# Patient Record
Sex: FEMALE | Race: WHITE | NOT HISPANIC OR LATINO | Employment: OTHER | URBAN - METROPOLITAN AREA
[De-identification: names, ages, dates, MRNs, and addresses within clinical notes are randomized per-mention and may not be internally consistent; named-entity substitution may affect disease eponyms.]

---

## 2023-11-07 PROBLEM — J44.9 COPD (CHRONIC OBSTRUCTIVE PULMONARY DISEASE) (MULTI): Status: ACTIVE | Noted: 2023-11-07

## 2023-11-07 PROBLEM — R14.0 ABDOMINAL BLOATING: Status: ACTIVE | Noted: 2023-11-07

## 2023-11-07 PROBLEM — E07.9 THYROID DISEASE: Status: ACTIVE | Noted: 2023-11-07

## 2023-11-07 PROBLEM — E66.9 OBESITY: Status: ACTIVE | Noted: 2023-11-07

## 2023-11-07 PROBLEM — I25.10 ARTERIOSCLEROTIC CARDIOVASCULAR DISEASE: Status: ACTIVE | Noted: 2023-11-07

## 2023-11-07 PROBLEM — E89.0 HYPOTHYROIDISM, POSTABLATIVE: Status: ACTIVE | Noted: 2023-11-07

## 2023-11-07 PROBLEM — I10 HYPERTENSION: Status: ACTIVE | Noted: 2023-11-07

## 2023-11-07 PROBLEM — E78.5 HYPERLIPIDEMIA: Status: ACTIVE | Noted: 2023-11-07

## 2023-11-07 PROBLEM — E11.9 DIABETES MELLITUS (MULTI): Status: ACTIVE | Noted: 2023-11-07

## 2023-11-07 PROBLEM — R73.09 ABNORMAL GLUCOSE: Status: ACTIVE | Noted: 2023-11-07

## 2023-11-07 PROBLEM — H52.4 HYPEROPIA WITH PRESBYOPIA: Status: ACTIVE | Noted: 2023-11-07

## 2023-11-07 PROBLEM — G47.33 OBSTRUCTIVE SLEEP APNEA: Status: ACTIVE | Noted: 2023-11-07

## 2023-11-07 PROBLEM — H52.00 HYPEROPIA WITH PRESBYOPIA: Status: ACTIVE | Noted: 2023-11-07

## 2023-11-07 RX ORDER — AMLODIPINE BESYLATE 10 MG/1
0.5 TABLET ORAL DAILY
COMMUNITY
Start: 2021-01-27

## 2023-11-07 RX ORDER — ATORVASTATIN CALCIUM 80 MG/1
1 TABLET, FILM COATED ORAL NIGHTLY
COMMUNITY
Start: 2021-01-27

## 2023-11-07 RX ORDER — DOXYCYCLINE HYCLATE 100 MG
TABLET ORAL
COMMUNITY
Start: 2022-02-08 | End: 2024-02-13 | Stop reason: WASHOUT

## 2023-11-07 RX ORDER — ALENDRONATE SODIUM 70 MG/1
TABLET ORAL
COMMUNITY
Start: 2021-11-04 | End: 2024-05-02 | Stop reason: ALTCHOICE

## 2023-11-07 RX ORDER — ASPIRIN 81 MG/1
1 TABLET ORAL DAILY
COMMUNITY
Start: 2021-01-27

## 2023-11-07 RX ORDER — ALBUTEROL SULFATE 90 UG/1
AEROSOL, METERED RESPIRATORY (INHALATION)
COMMUNITY

## 2023-11-07 RX ORDER — ROPINIROLE 0.5 MG/1
1 TABLET, FILM COATED ORAL DAILY
COMMUNITY

## 2023-11-07 RX ORDER — LEVOTHYROXINE SODIUM 50 UG/1
1 TABLET ORAL DAILY
COMMUNITY
Start: 2022-02-08 | End: 2024-04-23

## 2023-11-07 RX ORDER — GLIPIZIDE 10 MG/1
1 TABLET ORAL DAILY
COMMUNITY
Start: 2021-04-29 | End: 2023-11-08 | Stop reason: ALTCHOICE

## 2023-11-07 RX ORDER — METOPROLOL SUCCINATE 25 MG/1
1 TABLET, EXTENDED RELEASE ORAL DAILY
COMMUNITY
Start: 2021-01-27

## 2023-11-07 RX ORDER — LISINOPRIL 10 MG/1
1 TABLET ORAL DAILY
COMMUNITY
Start: 2021-01-27

## 2023-11-07 RX ORDER — BENZONATATE 100 MG/1
CAPSULE ORAL
COMMUNITY
Start: 2022-02-08 | End: 2024-02-13 | Stop reason: WASHOUT

## 2023-11-08 ENCOUNTER — OFFICE VISIT (OUTPATIENT)
Dept: ENDOCRINOLOGY | Facility: CLINIC | Age: 63
End: 2023-11-08
Payer: MEDICAID

## 2023-11-08 VITALS
SYSTOLIC BLOOD PRESSURE: 145 MMHG | DIASTOLIC BLOOD PRESSURE: 88 MMHG | BODY MASS INDEX: 27.72 KG/M2 | WEIGHT: 177 LBS | HEART RATE: 83 BPM

## 2023-11-08 DIAGNOSIS — E11.9 TYPE 2 DIABETES MELLITUS WITHOUT COMPLICATION, WITHOUT LONG-TERM CURRENT USE OF INSULIN (MULTI): Primary | ICD-10-CM

## 2023-11-08 PROCEDURE — 3077F SYST BP >= 140 MM HG: CPT | Performed by: INTERNAL MEDICINE

## 2023-11-08 PROCEDURE — 3079F DIAST BP 80-89 MM HG: CPT | Performed by: INTERNAL MEDICINE

## 2023-11-08 PROCEDURE — 4010F ACE/ARB THERAPY RXD/TAKEN: CPT | Performed by: INTERNAL MEDICINE

## 2023-11-08 PROCEDURE — 1036F TOBACCO NON-USER: CPT | Performed by: INTERNAL MEDICINE

## 2023-11-08 PROCEDURE — 99214 OFFICE O/P EST MOD 30 MIN: CPT | Performed by: INTERNAL MEDICINE

## 2023-11-08 ASSESSMENT — ENCOUNTER SYMPTOMS
DIARRHEA: 0
ENDOCRINE COMMENTS: AS ABOVE
COUGH: 0
NAUSEA: 0
UNEXPECTED WEIGHT CHANGE: 0
VOMITING: 0

## 2023-11-08 NOTE — LETTER
November 8, 2023     Vianey Summers PA-C  [address] - [facility] [address]    Patient: Marina Collazo   YOB: 1960   Date of Visit: 11/8/2023       Dear Dr. Vianey Summers PA-C:    Thank you for referring Marina Collazo to me for evaluation. Below are my notes for this consultation.  If you have questions, please do not hesitate to call me. I look forward to following your patient along with you.       Sincerely,     Pollo Graves MD      CC: No Recipients  ______________________________________________________________________________________    History Of Present Illness  Marina Collazo is a 63 y.o. female     Duration of type 2 diabetes mellitus:  3 years  Complications:  Coronary artery disease    A1c 6.7% per patient    Glipizide 2.5 mg BID, breaking 10 mg tablet  Jardiance 10 mg/day    DexCom G7  Patient is testing glucose 1440 times daily  Records reviewed, on file    Graves' orbitopathy  Last eye exam:  Dr Campbell, July 2023  CT orbits done    Past Medical History  She has a past medical history of Personal history of other diseases of the respiratory system (05/15/2014), Personal history of other endocrine, nutritional and metabolic disease (05/15/2014), and Type 2 diabetes mellitus without complications (CMS/[facility]) (07/01/2015).    Surgical History  She has a past surgical history that includes Appendectomy (05/15/2014) and Other surgical history (05/15/2020).     Social History  She reports that she has never smoked. She has never used smokeless tobacco. No history on file for alcohol use and drug use.    Family History  Family History   Problem Relation Name Age of Onset   • Stroke Mother         Allergies  Codeine, Doxylamine-dm-acetaminophen, Lorazepam, Other, Penicillins, and Propoxyphene    Review of Systems   Constitutional:  Negative for unexpected weight change.   Eyes:  Negative for visual disturbance.  "  Respiratory:  Negative for cough.    Gastrointestinal:  Negative for diarrhea, nausea and vomiting.   Endocrine:        As above         Last Recorded Vitals  Blood pressure 145/88, pulse 83, weight 80.3 kg (177 lb).    Physical Exam  HENT:      Head: Normocephalic.   Eyes:      Extraocular Movements: Extraocular movements intact.      Comments: Thyroid stare   Neck:      Thyroid: No thyromegaly.   Musculoskeletal:      Right foot: Deformity (2nd hammertoe) present.      Left foot: Deformity (surgical reconstruction, scars) present.   Lymphadenopathy:      Cervical: No cervical adenopathy.   Neurological:      Mental Status: She is alert.      Motor: No tremor.   Psychiatric:         Mood and Affect: Affect normal.          Relevant Results  Glucose (mg/dL)   Date Value   12/12/2022 99   01/18/2021 151 (H)   01/17/2021 257 (H)     Hemoglobin A1C (%)   Date Value   10/19/2022 6.7 (H)   06/23/2022 7.0 (H)   03/10/2022 7.5 (H)     Bicarbonate (mmol/L)   Date Value   12/12/2022 25   01/18/2021 26   01/17/2021 23     Urea Nitrogen (mg/dL)   Date Value   12/12/2022 14   01/18/2021 10   01/17/2021 6     Creatinine (mg/dL)   Date Value   12/12/2022 0.80   01/18/2021 0.64   01/17/2021 0.62     No components found for: \"IIKIUZXGQOAEYX5E\"  Lab Results   Component Value Date    CHOL 203 (H) 01/18/2021     Lab Results   Component Value Date    HDL 41.0 01/18/2021     No results found for: \"LDLCALC\"  Lab Results   Component Value Date    TRIG 293 (H) 01/18/2021     No components found for: \"CHOLHDL\"   Lab Results   Component Value Date    TSH 1.06 05/15/2020         IMPRESSION  TYPE 2 DIABETES MELLITUS  Glucose well controlled, overall  Patient cutting back glipizide to avoid hypoglycemia      RECOMMENDATIONS  Increase Jardiance to 25 mg once daily  STOP glipizide    Follow up 3-4 months  Labs as ordered by FABRICIO Summers.     "

## 2023-11-08 NOTE — PATIENT INSTRUCTIONS
RECOMMENDATIONS  Increase Jardiance to 25 mg once daily  STOP glipizide    Follow up 3-4 months  Labs as ordered by FABRICIO Summers.

## 2023-11-08 NOTE — PROGRESS NOTES
History Of Present Illness  Marina Collazo is a 63 y.o. female     Duration of type 2 diabetes mellitus:  3 years  Complications:  Coronary artery disease    A1c 6.7% per patient    Glipizide 2.5 mg BID, breaking 10 mg tablet  Jardiance 10 mg/day    DexCom G7  Patient is testing glucose 1440 times daily  Records reviewed, on file    Graves' orbitopathy  Last eye exam:  Dr Campbell, July 2023  CT orbits done    Past Medical History  She has a past medical history of Personal history of other diseases of the respiratory system (05/15/2014), Personal history of other endocrine, nutritional and metabolic disease (05/15/2014), and Type 2 diabetes mellitus without complications (CMS/[facility]) (07/01/2015).    Surgical History  She has a past surgical history that includes Appendectomy (05/15/2014) and Other surgical history (05/15/2020).     Social History  She reports that she has never smoked. She has never used smokeless tobacco. No history on file for alcohol use and drug use.    Family History  Family History   Problem Relation Name Age of Onset    Stroke Mother         Allergies  Codeine, Doxylamine-dm-acetaminophen, Lorazepam, Other, Penicillins, and Propoxyphene    Review of Systems   Constitutional:  Negative for unexpected weight change.   Eyes:  Negative for visual disturbance.   Respiratory:  Negative for cough.    Gastrointestinal:  Negative for diarrhea, nausea and vomiting.   Endocrine:        As above         Last Recorded Vitals  Blood pressure 145/88, pulse 83, weight 80.3 kg (177 lb).    Physical Exam  HENT:      Head: Normocephalic.   Eyes:      Extraocular Movements: Extraocular movements intact.      Comments: Thyroid stare   Neck:      Thyroid: No thyromegaly.   Musculoskeletal:      Right foot: Deformity (2nd hammertoe) present.      Left foot: Deformity (surgical reconstruction, scars) present.   Lymphadenopathy:      Cervical: No cervical adenopathy.   Neurological:      Mental Status: She is alert.  "     Motor: No tremor.   Psychiatric:         Mood and Affect: Affect normal.          Relevant Results  Glucose (mg/dL)   Date Value   12/12/2022 99   01/18/2021 151 (H)   01/17/2021 257 (H)     Hemoglobin A1C (%)   Date Value   10/19/2022 6.7 (H)   06/23/2022 7.0 (H)   03/10/2022 7.5 (H)     Bicarbonate (mmol/L)   Date Value   12/12/2022 25   01/18/2021 26   01/17/2021 23     Urea Nitrogen (mg/dL)   Date Value   12/12/2022 14   01/18/2021 10   01/17/2021 6     Creatinine (mg/dL)   Date Value   12/12/2022 0.80   01/18/2021 0.64   01/17/2021 0.62     No components found for: \"DRISYCCUDBYHMJ2G\"  Lab Results   Component Value Date    CHOL 203 (H) 01/18/2021     Lab Results   Component Value Date    HDL 41.0 01/18/2021     No results found for: \"LDLCALC\"  Lab Results   Component Value Date    TRIG 293 (H) 01/18/2021     No components found for: \"CHOLHDL\"   Lab Results   Component Value Date    TSH 1.06 05/15/2020         IMPRESSION  TYPE 2 DIABETES MELLITUS  Glucose well controlled, overall  Patient cutting back glipizide to avoid hypoglycemia      RECOMMENDATIONS  Increase Jardiance to 25 mg once daily  STOP glipizide    Follow up 3-4 months  Labs as ordered by FABRICIO Summers.     "

## 2023-12-01 ENCOUNTER — APPOINTMENT (OUTPATIENT)
Dept: OPHTHALMOLOGY | Facility: CLINIC | Age: 63
End: 2023-12-01
Payer: MEDICAID

## 2024-02-09 DIAGNOSIS — Z12.11 SCREEN FOR COLON CANCER: ICD-10-CM

## 2024-02-09 RX ORDER — SODIUM PICOSULFATE, MAGNESIUM OXIDE, AND ANHYDROUS CITRIC ACID 12; 3.5; 1 G/175ML; G/175ML; MG/175ML
2 LIQUID ORAL AS NEEDED
Qty: 2 ML | Refills: 0 | Status: SHIPPED | OUTPATIENT
Start: 2024-02-09 | End: 2024-02-13 | Stop reason: WASHOUT

## 2024-02-13 ENCOUNTER — OFFICE VISIT (OUTPATIENT)
Dept: ENDOCRINOLOGY | Facility: CLINIC | Age: 64
End: 2024-02-13
Payer: MEDICAID

## 2024-02-13 VITALS
SYSTOLIC BLOOD PRESSURE: 143 MMHG | DIASTOLIC BLOOD PRESSURE: 84 MMHG | BODY MASS INDEX: 28.35 KG/M2 | WEIGHT: 181 LBS | HEART RATE: 87 BPM

## 2024-02-13 DIAGNOSIS — E11.9 TYPE 2 DIABETES MELLITUS WITHOUT COMPLICATION, WITHOUT LONG-TERM CURRENT USE OF INSULIN (MULTI): Primary | ICD-10-CM

## 2024-02-13 LAB — POC HEMOGLOBIN A1C: 7.4 % (ref 4.2–6.5)

## 2024-02-13 PROCEDURE — 4010F ACE/ARB THERAPY RXD/TAKEN: CPT | Performed by: INTERNAL MEDICINE

## 2024-02-13 PROCEDURE — 3079F DIAST BP 80-89 MM HG: CPT | Performed by: INTERNAL MEDICINE

## 2024-02-13 PROCEDURE — 99214 OFFICE O/P EST MOD 30 MIN: CPT | Performed by: INTERNAL MEDICINE

## 2024-02-13 PROCEDURE — 3077F SYST BP >= 140 MM HG: CPT | Performed by: INTERNAL MEDICINE

## 2024-02-13 PROCEDURE — 1036F TOBACCO NON-USER: CPT | Performed by: INTERNAL MEDICINE

## 2024-02-13 PROCEDURE — 83036 HEMOGLOBIN GLYCOSYLATED A1C: CPT | Performed by: INTERNAL MEDICINE

## 2024-02-13 ASSESSMENT — ENCOUNTER SYMPTOMS
ENDOCRINE COMMENTS: AS ABOVE
DIARRHEA: 0
NAUSEA: 0
UNEXPECTED WEIGHT CHANGE: 0
SHORTNESS OF BREATH: 0
VOMITING: 0

## 2024-02-13 NOTE — LETTER
February 13, 2024     Vianey Summers PA-C  [address] - [facility] [address]    Patient: Marina Collazo   YOB: 1960   Date of Visit: 2/13/2024       Dear Dr. Vianey Summers PA-C:    Thank you for referring Marina Collazo to me for evaluation. Below are my notes for this consultation.  If you have questions, please do not hesitate to call me. I look forward to following your patient along with you.       Sincerely,     Pollo Gravse MD      CC: No Recipients  ______________________________________________________________________________________    History Of Present Illness  Marina Collazo is a 63 y.o. female     Duration of type 2 diabetes mellitus:  3 years  Complications:  Coronary artery disease     Regurgitated Jardiance 25 mg but tolerates 10 mg     Jardiance 10 mg/day     Glipizide stopped    DexCom G7  Patient is testing glucose 1440 times daily  Records reviewed, on file      panicks when her glucose is at 85 mg/dl on DexCom    Graves' orbitopathy  Last eye exam:  Dr Campbell, July 2023  CT orbits done    Past Medical History  She has a past medical history of Personal history of other diseases of the respiratory system (05/15/2014), Personal history of other endocrine, nutritional and metabolic disease (05/15/2014), and Type 2 diabetes mellitus without complications (CMS/[facility]) (07/01/2015).    Surgical History  She has a past surgical history that includes Appendectomy (05/15/2014) and Other surgical history (05/15/2020).     Social History  She reports that she has never smoked. She has never used smokeless tobacco. No history on file for alcohol use and drug use.    Family History  Family History   Problem Relation Name Age of Onset   • Stroke Mother         Medications  Current Outpatient Medications   Medication Instructions   • albuterol (Ventolin HFA) 90 mcg/actuation inhaler inhalation   • alendronate  (Fosamax) 70 mg tablet oral   • amLODIPine (Norvasc) 10 mg tablet 0.5 tablets, oral, Daily   • aspirin 81 mg EC tablet 1 tablet, oral, Daily   • atorvastatin (Lipitor) 80 mg tablet 1 tablet, oral, Nightly   • cetirizine (ZyrTEC) 10 mg capsule oral   • levothyroxine (Synthroid, Levoxyl) 50 mcg tablet 1 tablet, oral, Daily   • lisinopril 10 mg tablet 1 tablet, oral, Daily   • metoprolol succinate XL (Toprol-XL) 25 mg 24 hr tablet 1 tablet, oral, Daily   • rOPINIRole (Requip) 0.5 mg tablet 1 tablet, oral, Daily       Allergies  Codeine, Doxylamine-dm-acetaminophen, Jardiance [empagliflozin], Lorazepam, Other, Penicillins, and Propoxyphene    Review of Systems   Constitutional:  Negative for unexpected weight change.   Eyes:         Graves' orbitopathy   Respiratory:  Negative for shortness of breath.    Gastrointestinal:  Negative for diarrhea, nausea and vomiting.   Endocrine:        As above         Last Recorded Vitals  Blood pressure 143/84, pulse 87, weight 82.1 kg (181 lb).    Physical Exam  HENT:      Head: Normocephalic.   Eyes:      Comments: Periorbital edema, bilat   Neck:      Thyroid: No thyromegaly.   Cardiovascular:      Pulses:           Radial pulses are 2+ on the right side and 2+ on the left side.        Dorsalis pedis pulses are 2+ on the right side and 2+ on the left side.   Musculoskeletal:      Right lower leg: No edema.      Left lower leg: No edema.   Feet:      Comments: Ingrown right 1st toenail  Lymphadenopathy:      Cervical: No cervical adenopathy.   Skin:     Comments: No foot sores   Neurological:      Mental Status: She is alert.      Motor: No tremor.   Psychiatric:         Mood and Affect: Affect normal.          IMPRESSION  TYPE 2 DIABETES MELLITUS  Rapid A1c 7.4%  Advised she is not actually hypoglycemia, and has a very low risk of hypoglycemia attributable to Jardiance      RECOMMENDATIONS  Call Dr. Marino for evaluation of left 1st ingrown toenail.   Jardiance does not cause  hypoglycemia  Glucose above 70 is normal.     Labs as ordered by FABRICIO Summers    Follow up 6 months

## 2024-02-13 NOTE — PROGRESS NOTES
History Of Present Illness  Marina Collazo is a 63 y.o. female     Duration of type 2 diabetes mellitus:  3 years  Complications:  Coronary artery disease     Regurgitated Jardiance 25 mg but tolerates 10 mg     Jardiance 10 mg/day     Glipizide stopped    DexCom G7  Patient is testing glucose 1440 times daily  Records reviewed, on file      panicks when her glucose is at 85 mg/dl on DexCom    Graves' orbitopathy  Last eye exam:  Dr Campbell, July 2023  CT orbits done    Past Medical History  She has a past medical history of Personal history of other diseases of the respiratory system (05/15/2014), Personal history of other endocrine, nutritional and metabolic disease (05/15/2014), and Type 2 diabetes mellitus without complications (CMS/[facility]) (07/01/2015).    Surgical History  She has a past surgical history that includes Appendectomy (05/15/2014) and Other surgical history (05/15/2020).     Social History  She reports that she has never smoked. She has never used smokeless tobacco. No history on file for alcohol use and drug use.    Family History  Family History   Problem Relation Name Age of Onset    Stroke Mother         Medications  Current Outpatient Medications   Medication Instructions    albuterol (Ventolin HFA) 90 mcg/actuation inhaler inhalation    alendronate (Fosamax) 70 mg tablet oral    amLODIPine (Norvasc) 10 mg tablet 0.5 tablets, oral, Daily    aspirin 81 mg EC tablet 1 tablet, oral, Daily    atorvastatin (Lipitor) 80 mg tablet 1 tablet, oral, Nightly    cetirizine (ZyrTEC) 10 mg capsule oral    levothyroxine (Synthroid, Levoxyl) 50 mcg tablet 1 tablet, oral, Daily    lisinopril 10 mg tablet 1 tablet, oral, Daily    metoprolol succinate XL (Toprol-XL) 25 mg 24 hr tablet 1 tablet, oral, Daily    rOPINIRole (Requip) 0.5 mg tablet 1 tablet, oral, Daily       Allergies  Codeine, Doxylamine-dm-acetaminophen, Jardiance [empagliflozin], Lorazepam, Other, Penicillins, and Propoxyphene    Review of  Systems   Constitutional:  Negative for unexpected weight change.   Eyes:         Graves' orbitopathy   Respiratory:  Negative for shortness of breath.    Gastrointestinal:  Negative for diarrhea, nausea and vomiting.   Endocrine:        As above         Last Recorded Vitals  Blood pressure 143/84, pulse 87, weight 82.1 kg (181 lb).    Physical Exam  HENT:      Head: Normocephalic.   Eyes:      Comments: Periorbital edema, bilat   Neck:      Thyroid: No thyromegaly.   Cardiovascular:      Pulses:           Radial pulses are 2+ on the right side and 2+ on the left side.        Dorsalis pedis pulses are 2+ on the right side and 2+ on the left side.   Musculoskeletal:      Right lower leg: No edema.      Left lower leg: No edema.   Feet:      Comments: Ingrown right 1st toenail  Lymphadenopathy:      Cervical: No cervical adenopathy.   Skin:     Comments: No foot sores   Neurological:      Mental Status: She is alert.      Motor: No tremor.   Psychiatric:         Mood and Affect: Affect normal.          IMPRESSION  TYPE 2 DIABETES MELLITUS  Rapid A1c 7.4%  Advised she is not actually hypoglycemia, and has a very low risk of hypoglycemia attributable to Jardiance      RECOMMENDATIONS  Call Dr. Marino for evaluation of left 1st ingrown toenail.   Jardiance does not cause hypoglycemia  Glucose above 70 is normal.     Labs as ordered by FABRICIO Summers    Follow up 6 months

## 2024-02-13 NOTE — PATIENT INSTRUCTIONS
A1c 7.4%    RECOMMENDATIONS  Call Dr. Marino for evaluation of left 1st ingrown toenail.   Jardiance does not cause hypoglycemia  Glucose above 70 is normal.     Labs as ordered by FABRICIO Summers

## 2024-03-28 DIAGNOSIS — E11.9 TYPE 2 DIABETES MELLITUS WITHOUT COMPLICATION, WITHOUT LONG-TERM CURRENT USE OF INSULIN (MULTI): Primary | ICD-10-CM

## 2024-03-28 RX ORDER — BLOOD-GLUCOSE SENSOR
EACH MISCELLANEOUS
Qty: 9 EACH | Refills: 3 | Status: SHIPPED | OUTPATIENT
Start: 2024-03-28

## 2024-04-22 DIAGNOSIS — E89.0 HYPOTHYROIDISM, POSTABLATIVE: ICD-10-CM

## 2024-04-22 DIAGNOSIS — E11.9 TYPE 2 DIABETES MELLITUS WITHOUT COMPLICATION, WITHOUT LONG-TERM CURRENT USE OF INSULIN (MULTI): Primary | ICD-10-CM

## 2024-04-23 RX ORDER — LEVOTHYROXINE SODIUM 25 UG/1
TABLET ORAL
Qty: 12 TABLET | Refills: 11 | Status: SHIPPED | OUTPATIENT
Start: 2024-04-23

## 2024-04-23 RX ORDER — LEVOTHYROXINE SODIUM 50 UG/1
TABLET ORAL
Qty: 18 TABLET | Refills: 11 | Status: SHIPPED | OUTPATIENT
Start: 2024-04-23

## 2024-05-01 ENCOUNTER — ANESTHESIA EVENT (OUTPATIENT)
Dept: ANESTHESIOLOGY | Facility: HOSPITAL | Age: 64
End: 2024-05-01

## 2024-05-01 ENCOUNTER — PRE-ADMISSION TESTING (OUTPATIENT)
Dept: PREADMISSION TESTING | Facility: HOSPITAL | Age: 64
End: 2024-05-01
Payer: MEDICAID

## 2024-05-01 PROBLEM — I21.9 MI (MYOCARDIAL INFARCTION) (MULTI): Status: ACTIVE | Noted: 2024-05-01

## 2024-05-01 NOTE — ANESTHESIA PREPROCEDURE EVALUATION
Patient: Marina Collazo    Procedure Information    Date: 05/01/24  Reason: PAT         Relevant Problems   Anesthesia (within normal limits)      Cardiac   (+) Hyperlipidemia   (+) Hypertension   (+) MI (myocardial infarction) (Multi)      Pulmonary   (+) COPD (chronic obstructive pulmonary disease) (Multi)   (+) Obstructive sleep apnea      Neuro (within normal limits)      GI (within normal limits)      /Renal (within normal limits)      Liver (within normal limits)      Endocrine   (+) Diabetes mellitus (Multi)   (+) Hypothyroidism, postablative   (+) Obesity   (+) Thyroid disease      Hematology (within normal limits)      Musculoskeletal (within normal limits)      HEENT (within normal limits)      ID (within normal limits)      Skin (within normal limits)      GYN (within normal limits)      Circulatory   (+) Arteriosclerotic cardiovascular disease       Clinical information reviewed:                 Chart reviewed.  Cardiac clearance requested.    1/18/21 Echo-  CONCLUSIONS:   1. The left ventricular systolic function is normal with a 55-60% estimated ejection fraction.   2. Basal inferior segment is abnormal.   3. Spectral Doppler shows an impaired relaxation pattern of left ventricular diastolic filling.   4. There is mild mitral regurgitation.    There were no vitals filed for this visit.    Past Surgical History:   Procedure Laterality Date    APPENDECTOMY  05/15/2014    Appendectomy    OTHER SURGICAL HISTORY  05/15/2020    Foot fracture repair     Past Medical History:   Diagnosis Date    Personal history of other diseases of the respiratory system 05/15/2014    Personal history of asthma    Personal history of other endocrine, nutritional and metabolic disease 05/15/2014    History of Graves' disease    Type 2 diabetes mellitus without complications (Multi) 07/01/2015    Diabetes mellitus       Current Outpatient Medications:     albuterol (Ventolin HFA) 90 mcg/actuation inhaler, Inhale., Disp: ,  Rfl:     alendronate (Fosamax) 70 mg tablet, Take by mouth., Disp: , Rfl:     amLODIPine (Norvasc) 10 mg tablet, Take 0.5 tablets (5 mg) by mouth once daily., Disp: , Rfl:     aspirin 81 mg EC tablet, Take 1 tablet (81 mg) by mouth once daily., Disp: , Rfl:     atorvastatin (Lipitor) 80 mg tablet, Take 1 tablet (80 mg) by mouth once daily at bedtime., Disp: , Rfl:     blood-glucose sensor (Dexcom G7 Sensor) device, for continuous glucose monitoring, change every 10 days, Disp: 9 each, Rfl: 3    cetirizine (ZyrTEC) 10 mg capsule, Take by mouth., Disp: , Rfl:     levothyroxine (Synthroid, Levoxyl) 25 mcg tablet, TAKE 1 TABLET BY MOUTH ON TUESDAY , THURSDAY AND SATURDAY, Disp: 12 tablet, Rfl: 11    levothyroxine (Synthroid, Levoxyl) 50 mcg tablet, TAKE 1 TABLET BY MOUTH ON MONDAY, WEDNESDAY, FRIDAY AND SUNDAY, Disp: 18 tablet, Rfl: 11    lisinopril 10 mg tablet, Take 1 tablet (10 mg) by mouth once daily., Disp: , Rfl:     metoprolol succinate XL (Toprol-XL) 25 mg 24 hr tablet, Take 1 tablet (25 mg) by mouth once daily., Disp: , Rfl:     rOPINIRole (Requip) 0.5 mg tablet, Take 1 tablet (0.5 mg) by mouth once daily., Disp: , Rfl:   Prior to Admission medications    Medication Sig Start Date End Date Taking? Authorizing Provider   albuterol (Ventolin HFA) 90 mcg/actuation inhaler Inhale.    Historical Provider, MD   alendronate (Fosamax) 70 mg tablet Take by mouth. 11/4/21   Historical Provider, MD   amLODIPine (Norvasc) 10 mg tablet Take 0.5 tablets (5 mg) by mouth once daily. 1/27/21   Historical Provider, MD   aspirin 81 mg EC tablet Take 1 tablet (81 mg) by mouth once daily. 1/27/21   Historical Provider, MD   atorvastatin (Lipitor) 80 mg tablet Take 1 tablet (80 mg) by mouth once daily at bedtime. 1/27/21   Historical Provider, MD   blood-glucose sensor (Dexcom G7 Sensor) device for continuous glucose monitoring, change every 10 days 3/28/24   Pollo Graves MD   cetirizine (ZyrTEC) 10 mg capsule Take by mouth.     Historical Provider, MD   levothyroxine (Synthroid, Levoxyl) 25 mcg tablet TAKE 1 TABLET BY MOUTH ON TUESDAY , THURSDAY AND SATURDAY 4/23/24   Pollo Graves MD   levothyroxine (Synthroid, Levoxyl) 50 mcg tablet TAKE 1 TABLET BY MOUTH ON MONDAY, WEDNESDAY, FRIDAY AND SUNDAY 4/23/24   Pollo Graves MD   lisinopril 10 mg tablet Take 1 tablet (10 mg) by mouth once daily. 1/27/21   Historical Provider, MD   metoprolol succinate XL (Toprol-XL) 25 mg 24 hr tablet Take 1 tablet (25 mg) by mouth once daily. 1/27/21   Historical Provider, MD   rOPINIRole (Requip) 0.5 mg tablet Take 1 tablet (0.5 mg) by mouth once daily.    Historical Provider, MD     Allergies   Allergen Reactions    Codeine Unknown    Doxylamine-Dm-Acetaminophen Unknown    Jardiance [Empagliflozin] Nausea/vomiting    Lorazepam Unknown    Other Unknown    Penicillins Unknown    Propoxyphene Unknown     Social History     Tobacco Use    Smoking status: Never    Smokeless tobacco: Never   Substance Use Topics    Alcohol use: Not on file         Chemistry    Lab Results   Component Value Date/Time     12/12/2022 2211    K 3.8 12/12/2022 2211     12/12/2022 2211    CO2 25 12/12/2022 2211    BUN 14 12/12/2022 2211    CREATININE 0.80 12/12/2022 2211    Lab Results   Component Value Date/Time    CALCIUM 9.0 12/12/2022 2211    ALKPHOS 80 12/12/2022 2211    AST 11 12/12/2022 2211    ALT 14 12/12/2022 2211    BILITOT 0.3 12/12/2022 2211          Lab Results   Component Value Date/Time    WBC 11.2 12/12/2022 2211    HGB 13.1 12/12/2022 2211    HCT 39.7 12/12/2022 2211     12/12/2022 2211     Lab Results   Component Value Date/Time    PROTIME 13.6 (H) 01/17/2021 0013    INR 1.2 (H) 01/17/2021 0013     No results found for this or any previous visit (from the past 4464 hour(s)).  No results found for this or any previous visit from the past 1095 days.      NPO Detail:  No data recorded     PHYSICAL EXAM    Anesthesia Plan

## 2024-05-02 ENCOUNTER — PRE-ADMISSION TESTING (OUTPATIENT)
Dept: PREADMISSION TESTING | Facility: HOSPITAL | Age: 64
End: 2024-05-02
Payer: MEDICAID

## 2024-05-02 VITALS — BODY MASS INDEX: 26.68 KG/M2 | HEIGHT: 67 IN | WEIGHT: 170 LBS

## 2024-05-02 ASSESSMENT — DUKE ACTIVITY SCORE INDEX (DASI)
CAN YOU DO HEAVY WORK AROUND THE HOUSE LIKE SCRUBBING FLOORS OR LIFTING AND MOVING HEAVY FURNITURE: NO
CAN YOU WALK INDOORS, SUCH AS AROUND YOUR HOUSE: YES
CAN YOU PARTICIPATE IN MODERATE RECREATIONAL ACTIVITIES LIKE GOLF, BOWLING, DANCING, DOUBLES TENNIS OR THROWING A BASEBALL OR FOOTBALL: NO
CAN YOU TAKE CARE OF YOURSELF (EAT, DRESS, BATHE, OR USE TOILET): YES
CAN YOU DO YARD WORK LIKE RAKING LEAVES, WEEDING OR PUSHING A MOWER: NO
CAN YOU CLIMB A FLIGHT OF STAIRS OR WALK UP A HILL: YES
DASI METS SCORE: 5.3
CAN YOU WALK A BLOCK OR TWO ON LEVEL GROUND: YES
CAN YOU DO MODERATE WORK AROUND THE HOUSE LIKE VACUUMING, SWEEPING FLOORS OR CARRYING GROCERIES: NO
CAN YOU RUN A SHORT DISTANCE: NO
TOTAL_SCORE: 20.7
CAN YOU HAVE SEXUAL RELATIONS: YES
CAN YOU DO LIGHT WORK AROUND THE HOUSE LIKE DUSTING OR WASHING DISHES: YES
CAN YOU PARTICIPATE IN STRENOUS SPORTS LIKE SWIMMING, SINGLES TENNIS, FOOTBALL, BASKETBALL, OR SKIING: NO

## 2024-05-02 ASSESSMENT — PAIN - FUNCTIONAL ASSESSMENT: PAIN_FUNCTIONAL_ASSESSMENT: 0-10

## 2024-05-02 NOTE — PREPROCEDURE INSTRUCTIONS
Medication List            Accurate as of May 2, 2024 12:22 PM. Always use your most recent med list.                amLODIPine 10 mg tablet  Commonly known as: Norvasc  Medication Adjustments for Surgery: Continue until night before surgery     aspirin 81 mg EC tablet  Medication Adjustments for Surgery: Continue until night before surgery     atorvastatin 80 mg tablet  Commonly known as: Lipitor  Medication Adjustments for Surgery: Continue until night before surgery     Dexcom G7 Sensor device  Generic drug: blood-glucose sensor  for continuous glucose monitoring, change every 10 days  Medication Adjustments for Surgery: Other (Comment)     * levothyroxine 25 mcg tablet  Commonly known as: Synthroid, Levoxyl  TAKE 1 TABLET BY MOUTH ON TUESDAY , THURSDAY AND SATURDAY  Medication Adjustments for Surgery: Continue until night before surgery     * levothyroxine 50 mcg tablet  Commonly known as: Synthroid, Levoxyl  TAKE 1 TABLET BY MOUTH ON MONDAY, WEDNESDAY, FRIDAY AND SUNDAY  Medication Adjustments for Surgery: Continue until night before surgery     lisinopril 10 mg tablet  Medication Adjustments for Surgery: Continue until night before surgery     metoprolol succinate XL 25 mg 24 hr tablet  Commonly known as: Toprol-XL  Medication Adjustments for Surgery: Take morning of surgery with sip of water, no other fluids     rOPINIRole 0.5 mg tablet  Commonly known as: Requip  Medication Adjustments for Surgery: Continue until night before surgery     Ventolin HFA 90 mcg/actuation inhaler  Generic drug: albuterol  Medication Adjustments for Surgery: Take morning of surgery with sip of water, no other fluids     ZyrTEC 10 mg capsule  Generic drug: cetirizine  Medication Adjustments for Surgery: Continue until night before surgery           * This list has 2 medication(s) that are the same as other medications prescribed for you. Read the directions carefully, and ask your doctor or other care provider to review them  with you.                          You must see cardiology before your procedure for cardiac clearance. You have an appt scheduled with Chanel Dale CNP on May 22nd, 2024 for the cardiac clearance. Your colonoscopy will have to be rescheduled after you see cardiology.       Call outpatient surgery the day before between 1-3 pm to get your arrival time.   [phone]    No solid food the day before your procedure, only clear liquids.    You can have clear liquids up to 3 hrs prior to your procedure.  NO DAIRY, NO CREAMER, NO NON DAIRY OR ALMOND, OAT, COCONUT ETC.    Please refrain from smoking THC, cigarettes or vaping prior to your procedure at least 24 hrs.     When  you arrive park in the back ER parking lot.  Come through the ER lobby and take the first elevator to second floor.   Check in on the outpatient surgery window.    Leave all valuables at home and remove all piercing's.      Do mouth wash and bath for infection control if applicable.      NO driving for 24 hrs if you have had anesthesia or sedation.   You will also need a  if you are receiving sedation.       Bring glasses so you can read what you are signing.

## 2024-05-02 NOTE — PREPROCEDURE INSTRUCTIONS
Medication List            Accurate as of May 1, 2024 11:59 PM. Always use your most recent med list.                amLODIPine 10 mg tablet  Commonly known as: Norvasc     aspirin 81 mg EC tablet     atorvastatin 80 mg tablet  Commonly known as: Lipitor     Dexcom G7 Sensor device  Generic drug: blood-glucose sensor  for continuous glucose monitoring, change every 10 days     * levothyroxine 25 mcg tablet  Commonly known as: Synthroid, Levoxyl  TAKE 1 TABLET BY MOUTH ON TUESDAY , THURSDAY AND SATURDAY     * levothyroxine 50 mcg tablet  Commonly known as: Synthroid, Levoxyl  TAKE 1 TABLET BY MOUTH ON MONDAY, WEDNESDAY, FRIDAY AND SUNDAY     lisinopril 10 mg tablet     metoprolol succinate XL 25 mg 24 hr tablet  Commonly known as: Toprol-XL     rOPINIRole 0.5 mg tablet  Commonly known as: Requip     Ventolin HFA 90 mcg/actuation inhaler  Generic drug: albuterol     ZyrTEC 10 mg capsule  Generic drug: cetirizine           * This list has 2 medication(s) that are the same as other medications prescribed for you. Read the directions carefully, and ask your doctor or other care provider to review them with you.                                  Call outpatient surgery the day before between 1-3 pm to get your arrival time.   [phone]    No solid food the day before your procedure, only clear liquids.    Please  for your Clenpiq prep for your procedure.    You can have clear liquids up to 3 hrs prior to your procedure.  NO DAIRY, NO CREAMER, NO NON DAIRY OR ALMOND, OAT, COCONUT ETC.    Please refrain from smoking THC, cigarettes or vaping prior to your procedure at least 24 hrs.     When  you arrive park in the back ER parking lot.  Come through the ER lobby and take the first elevator to second floor.   Check in on the outpatient surgery window.    Leave all valuables at home and remove all piercing's.      Do mouth wash and bath for infection control if applicable.      NO driving for 24 hrs if you have had  anesthesia or sedation.   You will also need a  if you are receiving sedation.       Bring glasses so you can read what you are signing.

## 2024-05-16 ENCOUNTER — APPOINTMENT (OUTPATIENT)
Dept: GASTROENTEROLOGY | Facility: HOSPITAL | Age: 64
End: 2024-05-16
Payer: MEDICAID

## 2024-05-22 ENCOUNTER — OFFICE VISIT (OUTPATIENT)
Dept: CARDIOLOGY | Facility: HOSPITAL | Age: 64
End: 2024-05-22
Payer: MEDICAID

## 2024-05-22 VITALS
OXYGEN SATURATION: 97 % | WEIGHT: 174.6 LBS | BODY MASS INDEX: 27.35 KG/M2 | DIASTOLIC BLOOD PRESSURE: 89 MMHG | SYSTOLIC BLOOD PRESSURE: 154 MMHG | HEART RATE: 83 BPM

## 2024-05-22 DIAGNOSIS — I10 HYPERTENSION, UNSPECIFIED TYPE: ICD-10-CM

## 2024-05-22 DIAGNOSIS — Z01.810 PREOP CARDIOVASCULAR EXAM: Primary | ICD-10-CM

## 2024-05-22 DIAGNOSIS — E78.5 HYPERLIPIDEMIA, UNSPECIFIED HYPERLIPIDEMIA TYPE: ICD-10-CM

## 2024-05-22 DIAGNOSIS — I25.10 ARTERIOSCLEROTIC CARDIOVASCULAR DISEASE: ICD-10-CM

## 2024-05-22 PROCEDURE — 93005 ELECTROCARDIOGRAM TRACING: CPT | Performed by: NURSE PRACTITIONER

## 2024-05-22 PROCEDURE — 4010F ACE/ARB THERAPY RXD/TAKEN: CPT | Performed by: NURSE PRACTITIONER

## 2024-05-22 PROCEDURE — 3079F DIAST BP 80-89 MM HG: CPT | Performed by: NURSE PRACTITIONER

## 2024-05-22 PROCEDURE — 99213 OFFICE O/P EST LOW 20 MIN: CPT | Performed by: NURSE PRACTITIONER

## 2024-05-22 PROCEDURE — 3077F SYST BP >= 140 MM HG: CPT | Performed by: NURSE PRACTITIONER

## 2024-05-22 PROCEDURE — 93010 ELECTROCARDIOGRAM REPORT: CPT | Performed by: INTERNAL MEDICINE

## 2024-05-22 ASSESSMENT — ENCOUNTER SYMPTOMS
CARDIOVASCULAR NEGATIVE: 1
MYALGIAS: 1
CONSTITUTIONAL NEGATIVE: 1
DEPRESSION: 0
NEUROLOGICAL NEGATIVE: 1
ENDOCRINE NEGATIVE: 1
EYES NEGATIVE: 1
GASTROINTESTINAL NEGATIVE: 1
HEMATOLOGIC/LYMPHATIC NEGATIVE: 1
PSYCHIATRIC NEGATIVE: 1
RESPIRATORY NEGATIVE: 1

## 2024-05-22 NOTE — PATIENT INSTRUCTIONS
CALL WITH ANY QUESTIONS   MAY NOT STOP THE ASPIRIN   FOLLOW UP IN ONE YEAR   NO MEDICATION CHANGES   QUIT SMOKING

## 2024-05-22 NOTE — PROGRESS NOTES
Referred by Dr. Haque ref. provider found for No chief complaint on file.     History Of Present Illness:    Marina Collazo is a very pleasant 63 year old female with a history of HTN, HLD and CAD, she is here for a follow up visit. The patient is seen in collaboration with Dr. Resendez. Has been walking on a regular basis, watching her diet. Scheduling a colonoscopy. Denies chest pain, shortness of breath or heart palpitations.     Review of Systems   Constitutional: Negative.   HENT: Negative.     Eyes: Negative.    Cardiovascular: Negative.    Respiratory: Negative.     Endocrine: Negative.    Hematologic/Lymphatic: Negative.    Skin: Negative.    Musculoskeletal:  Positive for muscle weakness and myalgias.   Gastrointestinal: Negative.    Neurological: Negative.    Psychiatric/Behavioral: Negative.        Past Medical History:  She has a past medical history of COPD (chronic obstructive pulmonary disease) (Multi), Disease of thyroid gland, Hyperlipidemia, Hypertension, Personal history of other diseases of the respiratory system (05/15/2014), Personal history of other endocrine, nutritional and metabolic disease (05/15/2014), Sleep apnea, and Type 2 diabetes mellitus without complications (Multi) (07/01/2015).    Past Surgical History:  She has a past surgical history that includes Appendectomy (05/15/2014) and Other surgical history (05/15/2020).      Social History:  She reports that she has been smoking cigarettes. She has never used smokeless tobacco. She reports that she does not currently use alcohol. She reports current drug use. Drug: Marijuana.    Family History:  Family History   Problem Relation Name Age of Onset    Stroke Mother          Allergies:  Codeine, Doxylamine-dm-acetaminophen, Jardiance [empagliflozin], Lorazepam, Other, Penicillins, and Propoxyphene    Outpatient Medications:  Current Outpatient Medications   Medication Instructions    albuterol (Ventolin HFA) 90 mcg/actuation inhaler  inhalation    amLODIPine (Norvasc) 10 mg tablet 0.5 tablets, oral, Daily    aspirin 81 mg EC tablet 1 tablet, oral, Daily    atorvastatin (Lipitor) 80 mg tablet 1 tablet, oral, Nightly    blood-glucose sensor (Dexcom G7 Sensor) device for continuous glucose monitoring, change every 10 days    cetirizine (ZyrTEC) 10 mg capsule oral    levothyroxine (Synthroid, Levoxyl) 25 mcg tablet TAKE 1 TABLET BY MOUTH ON TUESDAY , THURSDAY AND SATURDAY    levothyroxine (Synthroid, Levoxyl) 50 mcg tablet TAKE 1 TABLET BY MOUTH ON MONDAY, WEDNESDAY, FRIDAY AND SUNDAY    lisinopril 10 mg tablet 1 tablet, oral, Daily    metoprolol succinate XL (Toprol-XL) 25 mg 24 hr tablet 1 tablet, oral, Daily    rOPINIRole (Requip) 0.5 mg tablet 1 tablet, oral, Daily        Last Recorded Vitals:  There were no vitals filed for this visit.    Physical Exam:  Physical Exam  Vitals reviewed.   HENT:      Head: Normocephalic.      Nose: Nose normal.   Eyes:      Pupils: Pupils are equal, round, and reactive to light.   Cardiovascular:      Rate and Rhythm: Normal rate and regular rhythm.   Pulmonary:      Effort: Pulmonary effort is normal.      Breath sounds: Normal breath sounds.   Abdominal:      General: Abdomen is flat.      Palpations: Abdomen is soft.   Musculoskeletal:         General: Normal range of motion.      Cervical back: Normal range of motion.   Skin:     General: Skin is warm and dry.   Neurological:      General: No focal deficit present.      Mental Status: He is alert and oriented to person, place, and time.   Psychiatric:         Mood and Affect: Mood normal.            Last Labs:  CBC -  Lab Results   Component Value Date    WBC 11.2 12/12/2022    HGB 13.1 12/12/2022    HCT 39.7 12/12/2022    MCV 95 12/12/2022     12/12/2022       CMP -  Lab Results   Component Value Date    CALCIUM 9.0 12/12/2022    PROT 6.3 (L) 12/12/2022    ALBUMIN 4.1 12/12/2022    AST 11 12/12/2022    ALT 14 12/12/2022    ALKPHOS 80 12/12/2022     BILITOT 0.3 12/12/2022       LIPID PANEL -   Lab Results   Component Value Date    CHOL 203 (H) 01/18/2021    TRIG 293 (H) 01/18/2021    HDL 41.0 01/18/2021    CHHDL 5.0 01/18/2021    LDLF 103 (H) 01/18/2021    VLDL 59 (H) 01/18/2021    NHDL 162 01/18/2021       RENAL FUNCTION PANEL -   Lab Results   Component Value Date    GLUCOSE 99 12/12/2022     12/12/2022    K 3.8 12/12/2022     12/12/2022    CO2 25 12/12/2022    ANIONGAP 11 12/12/2022    BUN 14 12/12/2022    CREATININE 0.80 12/12/2022    CALCIUM 9.0 12/12/2022    ALBUMIN 4.1 12/12/2022        Lab Results   Component Value Date     (H) 01/17/2021    HGBA1C 7.4 (A) 02/13/2024       Last Cardiology Tests:  ECG:  EKG independently reviewed from today showed sinus rhythm heart rate 87 bpm   Echo:  Echocardiogram 1/18/2021  1. The left ventricular systolic function is normal with a 55-60% estimated  ejection fraction.  2. Basal inferior segment is abnormal.  3. Spectral Doppler shows an impaired relaxation pattern of left ventricular  diastolic filling.  4. There is mild mitral regurgitation.  Ejection Fractions:  LVEF 55-60%  Cath:    Left heart cath 1/18/2021  1. Single vessel coronary artery disease without proximal left anterior  descending involvement.  2. Culprit vessel(s): right coronary artery.  3. S/P Successful drug eluting stent PCI to both proximal and mid RCA critical  lesions with 0% residual stenosis and BLANE 3 flow.  Stress Test:    Cardiac Imaging:      Assessment/Plan   Mrs. Collazo is a very pleasant 63 year old female with a history of DM, HTN, HLD and CAD, she was admitted with an NSTEMI (1/2021)with a PCI to the prox and mid RCA.  She is here for perioperative cardiac clearance for colonscopy. EKG shows sinus rhythm. She is able to walk without dyspnea. She is low cardiac risk for surgery and may proceed to the OR without further cardiac testing.  She continues to walk on a regular basis. Blood pressure and heart rate are  well controlled today.     Plan   -call with any questions   -follow up in one year   -continue Aspirin indefinitely  -continue Metoprolol, Atorvastatin, and Lisinopril   -continue Norvasc 5 mg daily   -smoking cessation was strongly encouraged   -low cardiac risk for surgery   -may not stop Aspirin         Chanel Shaver, APRN-CNP

## 2024-06-10 LAB
ATRIAL RATE: 87 BPM
P AXIS: 71 DEGREES
P OFFSET: 201 MS
P ONSET: 138 MS
PR INTERVAL: 162 MS
Q ONSET: 219 MS
QRS COUNT: 15 BEATS
QRS DURATION: 78 MS
QT INTERVAL: 402 MS
QTC CALCULATION(BAZETT): 483 MS
QTC FREDERICIA: 454 MS
R AXIS: 60 DEGREES
T AXIS: 70 DEGREES
T OFFSET: 420 MS
VENTRICULAR RATE: 87 BPM

## 2024-08-13 ENCOUNTER — APPOINTMENT (OUTPATIENT)
Dept: ENDOCRINOLOGY | Facility: CLINIC | Age: 64
End: 2024-08-13
Payer: MEDICAID

## 2024-08-13 VITALS
SYSTOLIC BLOOD PRESSURE: 173 MMHG | HEART RATE: 79 BPM | BODY MASS INDEX: 26.31 KG/M2 | WEIGHT: 168 LBS | DIASTOLIC BLOOD PRESSURE: 85 MMHG

## 2024-08-13 DIAGNOSIS — E11.9 TYPE 2 DIABETES MELLITUS WITHOUT COMPLICATION, WITHOUT LONG-TERM CURRENT USE OF INSULIN (MULTI): Primary | ICD-10-CM

## 2024-08-13 DIAGNOSIS — E89.0 HYPOTHYROIDISM, POSTABLATIVE: ICD-10-CM

## 2024-08-13 LAB — POC HEMOGLOBIN A1C: 6.7 % (ref 4.2–6.5)

## 2024-08-13 PROCEDURE — 3077F SYST BP >= 140 MM HG: CPT | Performed by: INTERNAL MEDICINE

## 2024-08-13 PROCEDURE — 4010F ACE/ARB THERAPY RXD/TAKEN: CPT | Performed by: INTERNAL MEDICINE

## 2024-08-13 PROCEDURE — 3079F DIAST BP 80-89 MM HG: CPT | Performed by: INTERNAL MEDICINE

## 2024-08-13 PROCEDURE — 99214 OFFICE O/P EST MOD 30 MIN: CPT | Performed by: INTERNAL MEDICINE

## 2024-08-13 PROCEDURE — 83036 HEMOGLOBIN GLYCOSYLATED A1C: CPT | Performed by: INTERNAL MEDICINE

## 2024-08-13 RX ORDER — LEVOTHYROXINE SODIUM 50 UG/1
TABLET ORAL
Qty: 18 TABLET | Refills: 11 | Status: SHIPPED | OUTPATIENT
Start: 2024-08-13

## 2024-08-13 RX ORDER — LEVOTHYROXINE SODIUM 25 UG/1
TABLET ORAL
Qty: 12 TABLET | Refills: 11 | Status: SHIPPED | OUTPATIENT
Start: 2024-08-13

## 2024-08-13 RX ORDER — EMPAGLIFLOZIN 10 MG/1
10 TABLET, FILM COATED ORAL DAILY
Qty: 30 TABLET | Refills: 11 | Status: SHIPPED | OUTPATIENT
Start: 2024-08-13 | End: 2025-08-13

## 2024-08-13 ASSESSMENT — ENCOUNTER SYMPTOMS
ARTHRALGIAS: 0
POLYDIPSIA: 0
FEVER: 0
NERVOUS/ANXIOUS: 0
VOMITING: 0
CONSTIPATION: 0
NAUSEA: 0
COUGH: 0
DIARRHEA: 0
APPETITE CHANGE: 0
SORE THROAT: 0
HEADACHES: 0
FREQUENCY: 0
ABDOMINAL PAIN: 0
SHORTNESS OF BREATH: 0

## 2024-08-13 NOTE — LETTER
August 13, 2024     Vianey Summers PA-C  [id number] - [facility] [address]    Patient: Marina Collazo   YOB: 1960   Date of Visit: 8/13/2024       Dear Dr. Vianey Summers PA-C:    Thank you for referring Marina Collazo to me for evaluation. Below are my notes for this consultation.  If you have questions, please do not hesitate to call me. I look forward to following your patient along with you.       Sincerely,     Pollo Graves MD      CC: No Recipients  ______________________________________________________________________________________    History Of Present Illness  Marina Collazo is a 64 y.o. female       Duration of type 2 diabetes mellitus:  4 years  Complications:  Coronary artery disease      Jardiance 10 mg/day      DexCom G7  Patient is testing glucose 1440 times daily  Records reviewed, on file     Graves' orbitopathy  Last eye exam:  Dr Campbell, July 2023  CT orbits done    Past Medical History  She has a past medical history of COPD (chronic obstructive pulmonary disease) (Multi), Disease of thyroid gland, Hyperlipidemia, Hypertension, Personal history of other diseases of the respiratory system (05/15/2014), Personal history of other endocrine, nutritional and metabolic disease (05/15/2014), Sleep apnea, and Type 2 diabetes mellitus without complications (Multi) (07/01/2015).    Surgical History  She has a past surgical history that includes Appendectomy (05/15/2014) and Other surgical history (05/15/2020).     Social History  She reports that she has been smoking cigarettes. She has never used smokeless tobacco. She reports that she does not currently use alcohol. She reports current drug use. Drug: Marijuana.    Family History  Family History   Problem Relation Name Age of Onset   • Stroke Mother         Medications  Current Outpatient Medications   Medication Instructions   • albuterol (Ventolin HFA) 90  mcg/actuation inhaler inhalation   • amLODIPine (Norvasc) 10 mg tablet 0.5 tablets, oral, Daily   • aspirin 81 mg EC tablet 1 tablet, oral, Daily   • atorvastatin (Lipitor) 80 mg tablet 1 tablet, oral, Nightly   • blood-glucose sensor (Dexcom G7 Sensor) device for continuous glucose monitoring, change every 10 days   • cetirizine (ZyrTEC) 10 mg capsule oral   • levothyroxine (Synthroid, Levoxyl) 25 mcg tablet TAKE 1 TABLET BY MOUTH ON TUESDAY , THURSDAY AND SATURDAY   • levothyroxine (Synthroid, Levoxyl) 50 mcg tablet TAKE 1 TABLET BY MOUTH ON MONDAY, WEDNESDAY, FRIDAY AND SUNDAY   • lisinopril 10 mg tablet 1 tablet, oral, Daily   • metoprolol succinate XL (Toprol-XL) 25 mg 24 hr tablet 1 tablet, oral, Daily   • rOPINIRole (Requip) 0.5 mg tablet 1 tablet, oral, Daily       Allergies  Chlorpheniram-dm-acetaminophen, Codeine, Doxylamine-dm-acetaminophen, Jardiance [empagliflozin], Lorazepam, Metformin, Other, Penicillins, and Propoxyphene    Review of Systems   Constitutional:  Negative for appetite change and fever.   HENT:  Negative for sore throat.         Denies dry mouth   Eyes:  Negative for visual disturbance.   Respiratory:  Negative for cough and shortness of breath.    Cardiovascular:  Negative for chest pain.   Gastrointestinal:  Negative for abdominal pain, constipation, diarrhea, nausea and vomiting.   Endocrine: Negative for polydipsia and polyuria.   Genitourinary:  Negative for frequency.   Musculoskeletal:  Negative for arthralgias.   Skin:  Negative for rash.   Neurological:  Negative for headaches.   Psychiatric/Behavioral:  The patient is not nervous/anxious.          Last Recorded Vitals  Blood pressure 173/85, pulse 79, weight 76.2 kg (168 lb).    Physical Exam  Constitutional:       General: She is not in acute distress.  HENT:      Head: Normocephalic.      Mouth/Throat:      Mouth: Mucous membranes are moist.   Eyes:      Extraocular Movements: Extraocular movements intact.      Comments:  Bilateral proptosis and periorbital edema   Neck:      Thyroid: No thyroid mass or thyromegaly.   Cardiovascular:      Pulses:           Radial pulses are 2+ on the right side and 2+ on the left side.        Dorsalis pedis pulses are 1+ on the right side and 2+ on the left side.   Musculoskeletal:      Right lower leg: No edema.      Left lower leg: No edema.   Feet:      Comments: Bilateral toe deformities  Dusky toes  Lymphadenopathy:      Cervical: No cervical adenopathy.   Skin:     Comments: No foot sores   Neurological:      Mental Status: She is alert.      Motor: No tremor.   Psychiatric:         Mood and Affect: Affect normal.         Relevant Results  Glucose (mg/dL)   Date Value   12/12/2022 99   01/18/2021 151 (H)   01/17/2021 257 (H)     POC HEMOGLOBIN A1c (%)   Date Value   02/13/2024 7.4 (A)     Hemoglobin A1C (%)   Date Value   10/19/2022 6.7 (H)   06/23/2022 7.0 (H)   03/10/2022 7.5 (H)     Bicarbonate (mmol/L)   Date Value   12/12/2022 25   01/18/2021 26   01/17/2021 23     Urea Nitrogen (mg/dL)   Date Value   12/12/2022 14   01/18/2021 10   01/17/2021 6     Creatinine (mg/dL)   Date Value   12/12/2022 0.80   01/18/2021 0.64   01/17/2021 0.62     Lab Results   Component Value Date    CHOL 203 (H) 01/18/2021     Lab Results   Component Value Date    HDL 41.0 01/18/2021     Lab Results   Component Value Date    TRIG 293 (H) 01/18/2021     Lab Results   Component Value Date    TSH 1.06 05/15/2020       IMPRESSION  TYPE 2 DIABETES MELLITUS  Rapid A1c 6.7%  Glucose well controlled    HYPOTHYROIDISM, POSTABLATIVE  Recent TSH not available      RECOMMENDATIONS  Continue Jardiance    Request TSH result from FABRICIO Summers.       Follow up 6 months

## 2024-08-13 NOTE — PROGRESS NOTES
History Of Present Illness  Marina Collazo is a 64 y.o. female       Duration of type 2 diabetes mellitus:  4 years  Complications:  Coronary artery disease      Jardiance 10 mg/day      DexCom G7  Patient is testing glucose 1440 times daily  Records reviewed, on file     Graves' orbitopathy  Last eye exam:  Dr Campbell, July 2023  CT orbits done    Past Medical History  She has a past medical history of COPD (chronic obstructive pulmonary disease) (Multi), Disease of thyroid gland, Hyperlipidemia, Hypertension, Personal history of other diseases of the respiratory system (05/15/2014), Personal history of other endocrine, nutritional and metabolic disease (05/15/2014), Sleep apnea, and Type 2 diabetes mellitus without complications (Multi) (07/01/2015).    Surgical History  She has a past surgical history that includes Appendectomy (05/15/2014) and Other surgical history (05/15/2020).     Social History  She reports that she has been smoking cigarettes. She has never used smokeless tobacco. She reports that she does not currently use alcohol. She reports current drug use. Drug: Marijuana.    Family History  Family History   Problem Relation Name Age of Onset    Stroke Mother         Medications  Current Outpatient Medications   Medication Instructions    albuterol (Ventolin HFA) 90 mcg/actuation inhaler inhalation    amLODIPine (Norvasc) 10 mg tablet 0.5 tablets, oral, Daily    aspirin 81 mg EC tablet 1 tablet, oral, Daily    atorvastatin (Lipitor) 80 mg tablet 1 tablet, oral, Nightly    blood-glucose sensor (Dexcom G7 Sensor) device for continuous glucose monitoring, change every 10 days    cetirizine (ZyrTEC) 10 mg capsule oral    levothyroxine (Synthroid, Levoxyl) 25 mcg tablet TAKE 1 TABLET BY MOUTH ON TUESDAY , THURSDAY AND SATURDAY    levothyroxine (Synthroid, Levoxyl) 50 mcg tablet TAKE 1 TABLET BY MOUTH ON MONDAY, WEDNESDAY, FRIDAY AND SUNDAY    lisinopril 10 mg tablet 1 tablet, oral, Daily    metoprolol  succinate XL (Toprol-XL) 25 mg 24 hr tablet 1 tablet, oral, Daily    rOPINIRole (Requip) 0.5 mg tablet 1 tablet, oral, Daily       Allergies  Chlorpheniram-dm-acetaminophen, Codeine, Doxylamine-dm-acetaminophen, Jardiance [empagliflozin], Lorazepam, Metformin, Other, Penicillins, and Propoxyphene    Review of Systems   Constitutional:  Negative for appetite change and fever.   HENT:  Negative for sore throat.         Denies dry mouth   Eyes:  Negative for visual disturbance.   Respiratory:  Negative for cough and shortness of breath.    Cardiovascular:  Negative for chest pain.   Gastrointestinal:  Negative for abdominal pain, constipation, diarrhea, nausea and vomiting.   Endocrine: Negative for polydipsia and polyuria.   Genitourinary:  Negative for frequency.   Musculoskeletal:  Negative for arthralgias.   Skin:  Negative for rash.   Neurological:  Negative for headaches.   Psychiatric/Behavioral:  The patient is not nervous/anxious.          Last Recorded Vitals  Blood pressure 173/85, pulse 79, weight 76.2 kg (168 lb).    Physical Exam  Constitutional:       General: She is not in acute distress.  HENT:      Head: Normocephalic.      Mouth/Throat:      Mouth: Mucous membranes are moist.   Eyes:      Extraocular Movements: Extraocular movements intact.      Comments: Bilateral proptosis and periorbital edema   Neck:      Thyroid: No thyroid mass or thyromegaly.   Cardiovascular:      Pulses:           Radial pulses are 2+ on the right side and 2+ on the left side.        Dorsalis pedis pulses are 1+ on the right side and 2+ on the left side.   Musculoskeletal:      Right lower leg: No edema.      Left lower leg: No edema.   Feet:      Comments: Bilateral toe deformities  Dusky toes  Lymphadenopathy:      Cervical: No cervical adenopathy.   Skin:     Comments: No foot sores   Neurological:      Mental Status: She is alert.      Motor: No tremor.   Psychiatric:         Mood and Affect: Affect normal.          Relevant Results  Glucose (mg/dL)   Date Value   12/12/2022 99   01/18/2021 151 (H)   01/17/2021 257 (H)     POC HEMOGLOBIN A1c (%)   Date Value   02/13/2024 7.4 (A)     Hemoglobin A1C (%)   Date Value   10/19/2022 6.7 (H)   06/23/2022 7.0 (H)   03/10/2022 7.5 (H)     Bicarbonate (mmol/L)   Date Value   12/12/2022 25   01/18/2021 26   01/17/2021 23     Urea Nitrogen (mg/dL)   Date Value   12/12/2022 14   01/18/2021 10   01/17/2021 6     Creatinine (mg/dL)   Date Value   12/12/2022 0.80   01/18/2021 0.64   01/17/2021 0.62     Lab Results   Component Value Date    CHOL 203 (H) 01/18/2021     Lab Results   Component Value Date    HDL 41.0 01/18/2021     Lab Results   Component Value Date    TRIG 293 (H) 01/18/2021     Lab Results   Component Value Date    TSH 1.06 05/15/2020       IMPRESSION  TYPE 2 DIABETES MELLITUS  Rapid A1c 6.7%  Glucose well controlled    HYPOTHYROIDISM, POSTABLATIVE  Recent TSH not available      RECOMMENDATIONS  Continue Jardiance    Request TSH result from FABRICIO Summers.       Follow up 6 months

## 2024-09-05 ENCOUNTER — PRE-ADMISSION TESTING (OUTPATIENT)
Dept: PREADMISSION TESTING | Facility: HOSPITAL | Age: 64
End: 2024-09-05
Payer: MEDICAID

## 2024-09-05 ENCOUNTER — PHARMACY VISIT (OUTPATIENT)
Dept: PHARMACY | Facility: CLINIC | Age: 64
End: 2024-09-05
Payer: MEDICAID

## 2024-09-05 VITALS — WEIGHT: 172 LBS | BODY MASS INDEX: 27 KG/M2 | HEIGHT: 67 IN

## 2024-09-05 DIAGNOSIS — Z12.11 SCREEN FOR COLON CANCER: Primary | ICD-10-CM

## 2024-09-05 PROCEDURE — RXMED WILLOW AMBULATORY MEDICATION CHARGE

## 2024-09-05 RX ORDER — SODIUM PICOSULFATE, MAGNESIUM OXIDE, AND ANHYDROUS CITRIC ACID 12; 3.5; 1 G/175ML; G/175ML; MG/175ML
2 LIQUID ORAL AS NEEDED
Qty: 350 ML | Refills: 0 | Status: SHIPPED | OUTPATIENT
Start: 2024-09-05

## 2024-09-05 RX ORDER — ALBUTEROL SULFATE 0.83 MG/ML
2.5 SOLUTION RESPIRATORY (INHALATION) EVERY 6 HOURS PRN
COMMUNITY

## 2024-09-05 ASSESSMENT — DUKE ACTIVITY SCORE INDEX (DASI)
CAN YOU TAKE CARE OF YOURSELF (EAT, DRESS, BATHE, OR USE TOILET): YES
CAN YOU DO HEAVY WORK AROUND THE HOUSE LIKE SCRUBBING FLOORS OR LIFTING AND MOVING HEAVY FURNITURE: NO
CAN YOU CLIMB A FLIGHT OF STAIRS OR WALK UP A HILL: YES
CAN YOU DO LIGHT WORK AROUND THE HOUSE LIKE DUSTING OR WASHING DISHES: YES
CAN YOU PARTICIPATE IN STRENOUS SPORTS LIKE SWIMMING, SINGLES TENNIS, FOOTBALL, BASKETBALL, OR SKIING: NO
CAN YOU WALK INDOORS, SUCH AS AROUND YOUR HOUSE: YES
CAN YOU RUN A SHORT DISTANCE: NO
CAN YOU WALK A BLOCK OR TWO ON LEVEL GROUND: YES
CAN YOU DO MODERATE WORK AROUND THE HOUSE LIKE VACUUMING, SWEEPING FLOORS OR CARRYING GROCERIES: YES
CAN YOU PARTICIPATE IN MODERATE RECREATIONAL ACTIVITIES LIKE GOLF, BOWLING, DANCING, DOUBLES TENNIS OR THROWING A BASEBALL OR FOOTBALL: NO
CAN YOU DO YARD WORK LIKE RAKING LEAVES, WEEDING OR PUSHING A MOWER: YES

## 2024-09-05 NOTE — PREPROCEDURE INSTRUCTIONS
Medication List            Accurate as of September 5, 2024  3:49 PM. Always use your most recent med list.                amLODIPine 10 mg tablet  Commonly known as: Norvasc  Medication Adjustments for Surgery: Take on the morning of surgery     aspirin 81 mg EC tablet  Medication Adjustments for Surgery: Take last dose 1 day (24 hours) before surgery     atorvastatin 80 mg tablet  Commonly known as: Lipitor  Medication Adjustments for Surgery: Take last dose 1 day (24 hours) before surgery     Dexcom G7 Sensor device  Generic drug: blood-glucose sensor  for continuous glucose monitoring, change every 10 days     Jardiance 10 mg  Generic drug: empagliflozin  Take 1 tablet (10 mg) by mouth once daily.  Medication Adjustments for Surgery: Take last dose 3 days before surgery     * levothyroxine 25 mcg tablet  Commonly known as: Synthroid, Levoxyl  TAKE 1 TABLET BY MOUTH ON TUESDAY , THURSDAY AND SATURDAY     * levothyroxine 50 mcg tablet  Commonly known as: Synthroid, Levoxyl  TAKE 1 TABLET BY MOUTH ON MONDAY, WEDNESDAY, FRIDAY AND SUNDAY  Medication Adjustments for Surgery: Take on the morning of surgery     lisinopril 10 mg tablet  Medication Adjustments for Surgery: Take last dose 1 day (24 hours) before surgery     metoprolol succinate XL 25 mg 24 hr tablet  Commonly known as: Toprol-XL  Medication Adjustments for Surgery: Take on the morning of surgery     rOPINIRole 0.5 mg tablet  Commonly known as: Requip  Medication Adjustments for Surgery: Take last dose 1 day (24 hours) before surgery     * Ventolin HFA 90 mcg/actuation inhaler  Generic drug: albuterol     * albuterol 2.5 mg /3 mL (0.083 %) nebulizer solution  Medication Adjustments for Surgery: Take on the morning of surgery     ZyrTEC 10 mg capsule  Generic drug: cetirizine  Medication Adjustments for Surgery: Take last dose 1 day (24 hours) before surgery           * This list has 4 medication(s) that are the same as other medications prescribed for  Received a fax from the Pharmacy requesting a medication refill.  Medication is set up as pending orders from medication list.     Preferred pharmacy is set up and verified.      you. Read the directions carefully, and ask your doctor or other care provider to review them with you.                                  NPO Instructions:    Follow instructions. Day before procedure-may have light breakfast by 9am (ex. 1 egg, 1 piece of toast).  Then CLEAR LIQUIDS ONLY-No dairy products, nothing red/purple.  Take first part of prep- Evening Before Procedure.  Drink lots of liquids.  Day of Procedure- 3-4am Take Second Part of Prep.   STOP DRINKING 3 HOURS PRIOR TO PROCEDURE.  Take medications as instructed.   No Gum, Candy, Mints or Smoking day of the procedure!     Additional Instructions:     Review your medication instructions, take indicated medications  Wear  comfortable loose fitting clothing  All jewelry and valuables should be left at home    Park in back of hospital by ER. Come up to Second floor-Outpt dept to check in.  Bring Photo ID and Insurance card,   You MUST have a  with you.  No more than 2 visitors with you please.      If you get ill at all before your procedure- CALL YOUR DOCTOR/SURGEON.  We want you in the best shape that is possible. Any sickness might lead to your procedure being delayed.      Call Outpatient dept at [phone] the night before your procedure (Friday for Monday procedure), between 1-3 pm.      Do not smoke day of your procedure.     Stop Jardiance Monday, Sept 16-until procedure is completed.

## 2024-09-16 RX ORDER — SODIUM CHLORIDE, SODIUM LACTATE, POTASSIUM CHLORIDE, CALCIUM CHLORIDE 600; 310; 30; 20 MG/100ML; MG/100ML; MG/100ML; MG/100ML
50 INJECTION, SOLUTION INTRAVENOUS CONTINUOUS
OUTPATIENT
Start: 2024-09-16

## 2024-09-19 ENCOUNTER — APPOINTMENT (OUTPATIENT)
Dept: GASTROENTEROLOGY | Facility: HOSPITAL | Age: 64
End: 2024-09-19
Payer: MEDICAID

## 2024-10-15 ENCOUNTER — APPOINTMENT (OUTPATIENT)
Dept: OBSTETRICS AND GYNECOLOGY | Facility: CLINIC | Age: 64
End: 2024-10-15
Payer: MEDICAID

## 2024-10-15 VITALS
BODY MASS INDEX: 27.47 KG/M2 | DIASTOLIC BLOOD PRESSURE: 96 MMHG | SYSTOLIC BLOOD PRESSURE: 148 MMHG | WEIGHT: 175 LBS | HEIGHT: 67 IN

## 2024-10-15 DIAGNOSIS — Z30.432 ENCOUNTER FOR IUD REMOVAL: Primary | ICD-10-CM

## 2024-10-15 DIAGNOSIS — N89.8 VAGINAL DISCHARGE: ICD-10-CM

## 2024-10-15 PROCEDURE — 3077F SYST BP >= 140 MM HG: CPT | Performed by: OBSTETRICS & GYNECOLOGY

## 2024-10-15 PROCEDURE — 3008F BODY MASS INDEX DOCD: CPT | Performed by: OBSTETRICS & GYNECOLOGY

## 2024-10-15 PROCEDURE — 99202 OFFICE O/P NEW SF 15 MIN: CPT | Performed by: OBSTETRICS & GYNECOLOGY

## 2024-10-15 PROCEDURE — 58301 REMOVE INTRAUTERINE DEVICE: CPT | Performed by: OBSTETRICS & GYNECOLOGY

## 2024-10-15 PROCEDURE — 87205 SMEAR GRAM STAIN: CPT

## 2024-10-15 PROCEDURE — 3080F DIAST BP >= 90 MM HG: CPT | Performed by: OBSTETRICS & GYNECOLOGY

## 2024-10-15 PROCEDURE — 4010F ACE/ARB THERAPY RXD/TAKEN: CPT | Performed by: OBSTETRICS & GYNECOLOGY

## 2024-10-15 NOTE — PROGRESS NOTES
"HPI  Marina Collazo is a 64 y.o.  new patient presents today for abnormal vaginal discharge and IUD removal.     Of note, she was initially scheduled for annual exam, but patient states she had it done 3 months ago and was scheduled incorrectly. She is here for IUD removal instead.     Pt requests IUD removal:  Pt reports IUD in place. Thought it was removed previously but PCP noticed the string on exam. No records of insertion or IUD type found in  system.   last pap, pap hx: per patient 3 months ago Dr. Ely. IUD strings visualized at this visit per patient. Needs it removed.  Most recent notes reviewed through  [employer] and Care Everywhere  No pap smear or PCP information found in the  system or through Care Everywhere to verify above    Abnormal vaginal discharge:  She is experiencing vaginal discharge x2 months ago.   Had yeast infection x3 months ago. Resolved with medications per patient. No symptoms now that would lead her to believe it is a yeast infection.  No concern for STIs  Most recent results reviewed in  system and through Care Everywhere.  No vaginal swab or yeast result information found in the  system or through Care Everywhere    H/o appendicitis.    OB hx:      ROS notable for sinus problems, back pain, joint swelling, bleed easily and bruise easily, vaginal pain.  10 point ROS negative except as listed above.     Physical exam  BP (!) 148/96   Ht 1.702 m (5' 7\")   Wt 79.4 kg (175 lb)   BMI 27.41 kg/m²      Physical Exam  Constitutional:       Appearance: Normal appearance.   HENT:      Head: Normocephalic and atraumatic.   Eyes:      Extraocular Movements: Extraocular movements intact.      Conjunctiva/sclera: Conjunctivae normal.      Pupils: Pupils are equal, round, and reactive to light.   Pulmonary:      Effort: Pulmonary effort is normal.   Abdominal:      General: Abdomen is flat.      Palpations: Abdomen is soft.   Genitourinary:     General: Normal vulva.    "   Vagina: Normal.      Cervix: Normal.      Uterus: Normal.       Adnexa: Right adnexa normal and left adnexa normal.      Comments: IUD string visible  Skin:     General: Skin is warm and dry.   Neurological:      General: No focal deficit present.      Mental Status: She is alert.   Psychiatric:         Mood and Affect: Mood normal.         Behavior: Behavior normal.         Thought Content: Thought content normal.         Judgment: Judgment normal.     IUD Removal    Performed by: Lavell Jean-Baptiste MD  Authorized by: Lavell Jean-Baptiste MD    Procedure: IUD removal    Consent obtained by patient, parent, or legal power of  - including discussion of procedure risks and benefits, patient questions answered, and patient education provided: yes    Reason for removal: patient request    Strings visualized: yes    Tenaculum applied to cervix: no    IUD grasped by forceps: yes    Performed with ultrasound guidance: no    IUD removed: yes    Removed without complications: yes    IUD intact: yes    Cervix manually dilated: no        Assessment/Plan  IUD removed today.  Patient tolerated procedure without complications.    Vaginal discharge:  Yeast swab collected and sent to path      Scribe Attestation  By signing my name below, Rosalind GONZALEZ, Janeyibkayley   attest that this documentation has been prepared under the direction and in the presence of Lavell Jean-Baptiste MD.

## 2024-10-16 ENCOUNTER — APPOINTMENT (OUTPATIENT)
Dept: SLEEP MEDICINE | Facility: CLINIC | Age: 64
End: 2024-10-16
Payer: MEDICAID

## 2024-10-16 VITALS
WEIGHT: 174.02 LBS | OXYGEN SATURATION: 97 % | DIASTOLIC BLOOD PRESSURE: 87 MMHG | SYSTOLIC BLOOD PRESSURE: 151 MMHG | BODY MASS INDEX: 27.26 KG/M2

## 2024-10-16 DIAGNOSIS — F17.210 CIGARETTE NICOTINE DEPENDENCE WITHOUT COMPLICATION: ICD-10-CM

## 2024-10-16 DIAGNOSIS — Z78.9 INTOLERANCE OF CONTINUOUS POSITIVE AIRWAY PRESSURE (CPAP) VENTILATION: ICD-10-CM

## 2024-10-16 DIAGNOSIS — G25.81 RLS (RESTLESS LEGS SYNDROME): ICD-10-CM

## 2024-10-16 DIAGNOSIS — J34.2 ACQUIRED DEVIATED NASAL SEPTUM: ICD-10-CM

## 2024-10-16 DIAGNOSIS — G47.33 OSA (OBSTRUCTIVE SLEEP APNEA): Primary | ICD-10-CM

## 2024-10-16 LAB
BACTERIAL VAGINOSIS VAG-IMP: NORMAL
CLUE CELLS VAG LPF-#/AREA: NORMAL /[LPF]
NUGENT SCORE: 4
YEAST VAG WET PREP-#/AREA: NORMAL

## 2024-10-16 PROCEDURE — 99204 OFFICE O/P NEW MOD 45 MIN: CPT | Performed by: PSYCHIATRY & NEUROLOGY

## 2024-10-16 PROCEDURE — 4010F ACE/ARB THERAPY RXD/TAKEN: CPT | Performed by: PSYCHIATRY & NEUROLOGY

## 2024-10-16 PROCEDURE — 3077F SYST BP >= 140 MM HG: CPT | Performed by: PSYCHIATRY & NEUROLOGY

## 2024-10-16 PROCEDURE — 3079F DIAST BP 80-89 MM HG: CPT | Performed by: PSYCHIATRY & NEUROLOGY

## 2024-10-16 ASSESSMENT — PATIENT HEALTH QUESTIONNAIRE - PHQ9
SUM OF ALL RESPONSES TO PHQ9 QUESTIONS 1 AND 2: 0
1. LITTLE INTEREST OR PLEASURE IN DOING THINGS: NOT AT ALL
2. FEELING DOWN, DEPRESSED OR HOPELESS: NOT AT ALL
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
## 2024-11-01 ENCOUNTER — APPOINTMENT (OUTPATIENT)
Dept: SLEEP MEDICINE | Facility: CLINIC | Age: 64
End: 2024-11-01
Payer: MEDICAID

## 2024-12-04 ENCOUNTER — APPOINTMENT (OUTPATIENT)
Dept: SLEEP MEDICINE | Facility: CLINIC | Age: 64
End: 2024-12-04
Payer: MEDICAID

## 2024-12-12 ENCOUNTER — CLINICAL SUPPORT (OUTPATIENT)
Dept: SLEEP MEDICINE | Facility: CLINIC | Age: 64
End: 2024-12-12
Payer: MEDICAID

## 2024-12-12 DIAGNOSIS — Z78.9 INTOLERANCE OF CONTINUOUS POSITIVE AIRWAY PRESSURE (CPAP) VENTILATION: ICD-10-CM

## 2024-12-12 DIAGNOSIS — G47.33 OSA (OBSTRUCTIVE SLEEP APNEA): ICD-10-CM

## 2024-12-13 NOTE — PROGRESS NOTES
[facility] TECH NOTE:     Patient: Marina Collazo   MRN//AGE: [id number]  1960  64 y.o.   Technologist: ALYCIA Armas   Room: 105   Service Date: 2024        Sleep Testing Location: [facility]    [place]: [facility]    TECHNOLOGIST SLEEP STUDY PROCEDURE NOTE:   This sleep study is being conducted according to the policies and procedures outlined by the AAS accreditation standards.  The sleep study procedure and processes involved during this appointment was explained to the patient/patient’s family, questions were answered. The patient/family verbalized understanding.      The patient is a 64 y.o. year old female scheduled for a diagnostic PSG  with montage of:  diagnostic PSG . she arrived for her appointment.      The study that was ultimately completed was a diagnostic PSG.      The full study Was completed.  Patient questionnaires completed?: yes     Consents signed? yes    Initial Fall Risk Screening:     Marina has not fallen in the last 6 months. Marina does not have a fear of falling. She does not need assistance with sitting, standing, or walking. she does not need assistance walking in her home. she does not need assistance in an unfamiliar setting. The patient is not using an assistive device.     Brief Study observations: Patient required oxygen due to persistent desaturations (<88%, > 5 minutes).    Deviation to order/protocol and reason: no      If PAP, which was preferred mask/pressure/mode: n/a      Other:Physician on call was contacted , Dr. Bekah Rodriguez gave order for oxygen per protocol    After the procedure, the patient/family was informed to ensure followup with ordering clinician for testing results.      Technologist: LAYCIA Armas

## 2024-12-17 ENCOUNTER — TELEPHONE (OUTPATIENT)
Dept: OBSTETRICS AND GYNECOLOGY | Facility: CLINIC | Age: 64
End: 2024-12-17
Payer: MEDICAID

## 2025-01-15 ENCOUNTER — APPOINTMENT (OUTPATIENT)
Dept: SLEEP MEDICINE | Facility: CLINIC | Age: 65
End: 2025-01-15
Payer: MEDICAID

## 2025-01-30 ENCOUNTER — APPOINTMENT (OUTPATIENT)
Dept: SLEEP MEDICINE | Facility: CLINIC | Age: 65
End: 2025-01-30
Payer: MEDICAID

## 2025-01-30 VITALS
HEART RATE: 85 BPM | BODY MASS INDEX: 27.63 KG/M2 | OXYGEN SATURATION: 95 % | SYSTOLIC BLOOD PRESSURE: 157 MMHG | WEIGHT: 176.4 LBS | DIASTOLIC BLOOD PRESSURE: 97 MMHG

## 2025-01-30 DIAGNOSIS — G47.33 OSA (OBSTRUCTIVE SLEEP APNEA): Primary | ICD-10-CM

## 2025-01-30 DIAGNOSIS — G47.34 SLEEP RELATED HYPOXIA: ICD-10-CM

## 2025-01-30 DIAGNOSIS — G25.81 RLS (RESTLESS LEGS SYNDROME): ICD-10-CM

## 2025-01-30 PROCEDURE — 4010F ACE/ARB THERAPY RXD/TAKEN: CPT | Performed by: PSYCHIATRY & NEUROLOGY

## 2025-01-30 PROCEDURE — 3080F DIAST BP >= 90 MM HG: CPT | Performed by: PSYCHIATRY & NEUROLOGY

## 2025-01-30 PROCEDURE — 99214 OFFICE O/P EST MOD 30 MIN: CPT | Performed by: PSYCHIATRY & NEUROLOGY

## 2025-01-30 PROCEDURE — 3077F SYST BP >= 140 MM HG: CPT | Performed by: PSYCHIATRY & NEUROLOGY
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
## 2025-01-31 ENCOUNTER — TELEPHONE (OUTPATIENT)
Dept: SLEEP MEDICINE | Facility: CLINIC | Age: 65
End: 2025-01-31
Payer: MEDICAID

## 2025-01-31 LAB
FERRITIN SERPL-MCNC: 70 NG/ML (ref 16–288)
IRON SATN MFR SERPL: 35 % (CALC) (ref 16–45)
IRON SERPL-MCNC: 152 MCG/DL (ref 45–160)
TIBC SERPL-MCNC: 433 MCG/DL (CALC) (ref 250–450)

## 2025-01-31 NOTE — TELEPHONE ENCOUNTER
I tried calling the pt to discuss her iron blood test results, but there was no answer. I left a voicemail asking her to call us next Tuesday so I can go over the results with her. I will try to call her again in a few days if she does not call on Tuesday.

## 2025-02-12 ENCOUNTER — TELEPHONE (OUTPATIENT)
Dept: SLEEP MEDICINE | Facility: CLINIC | Age: 65
End: 2025-02-12
Payer: MEDICAID

## 2025-02-18 ENCOUNTER — APPOINTMENT (OUTPATIENT)
Dept: ENDOCRINOLOGY | Facility: CLINIC | Age: 65
End: 2025-02-18
Payer: MEDICAID

## 2025-03-02 ENCOUNTER — APPOINTMENT (OUTPATIENT)
Dept: SLEEP MEDICINE | Facility: CLINIC | Age: 65
End: 2025-03-02
Payer: MEDICAID

## 2025-03-06 DIAGNOSIS — Z12.11 SPECIAL SCREENING FOR MALIGNANT NEOPLASMS, COLON: ICD-10-CM

## 2025-03-06 RX ORDER — SODIUM PICOSULFATE, MAGNESIUM OXIDE, AND ANHYDROUS CITRIC ACID 12; 3.5; 1 G/175ML; G/175ML; MG/175ML
LIQUID ORAL
Qty: 350 ML | Refills: 0 | Status: CANCELLED | OUTPATIENT
Start: 2025-03-06

## 2025-03-13 ENCOUNTER — APPOINTMENT (OUTPATIENT)
Dept: SLEEP MEDICINE | Facility: CLINIC | Age: 65
End: 2025-03-13
Payer: MEDICAID

## 2025-03-24 ENCOUNTER — HOSPITAL ENCOUNTER (OUTPATIENT)
Dept: RADIOLOGY | Facility: HOSPITAL | Age: 65
Discharge: HOME | End: 2025-03-24
Payer: MEDICAID

## 2025-03-24 VITALS — BODY MASS INDEX: 27.62 KG/M2 | HEIGHT: 67 IN | WEIGHT: 176 LBS

## 2025-03-24 DIAGNOSIS — Z12.31 ENCOUNTER FOR SCREENING MAMMOGRAM FOR MALIGNANT NEOPLASM OF BREAST: ICD-10-CM

## 2025-03-24 PROCEDURE — 77067 SCR MAMMO BI INCL CAD: CPT

## 2025-03-31 ENCOUNTER — HOSPITAL ENCOUNTER (OUTPATIENT)
Dept: RADIOLOGY | Facility: EXTERNAL LOCATION | Age: 65
Discharge: HOME | End: 2025-03-31
Payer: MEDICAID

## 2025-04-21 DIAGNOSIS — E89.0 HYPOTHYROIDISM, POSTABLATIVE: ICD-10-CM

## 2025-04-21 NOTE — TELEPHONE ENCOUNTER
last appt-8/13/24  next appt- 6/25/25  Last filled- 8/13/24  Pharmacy- [facility]

## 2025-04-27 RX ORDER — LEVOTHYROXINE SODIUM 50 UG/1
TABLET ORAL
Qty: 18 TABLET | Refills: 11 | Status: SHIPPED | OUTPATIENT
Start: 2025-04-27

## 2025-04-27 RX ORDER — LEVOTHYROXINE SODIUM 25 UG/1
TABLET ORAL
Qty: 12 TABLET | Refills: 11 | Status: SHIPPED | OUTPATIENT
Start: 2025-04-27

## 2025-04-28 ENCOUNTER — HOSPITAL ENCOUNTER (EMERGENCY)
Facility: HOSPITAL | Age: 65
Discharge: HOME | End: 2025-04-28
Payer: MEDICAID

## 2025-04-28 VITALS
DIASTOLIC BLOOD PRESSURE: 68 MMHG | TEMPERATURE: 98.1 F | WEIGHT: 170 LBS | RESPIRATION RATE: 18 BRPM | OXYGEN SATURATION: 98 % | HEIGHT: 67 IN | HEART RATE: 78 BPM | BODY MASS INDEX: 26.68 KG/M2 | SYSTOLIC BLOOD PRESSURE: 117 MMHG

## 2025-04-28 DIAGNOSIS — S61.211A LACERATION OF LEFT INDEX FINGER WITHOUT FOREIGN BODY WITHOUT DAMAGE TO NAIL, INITIAL ENCOUNTER: Primary | ICD-10-CM

## 2025-04-28 PROCEDURE — 2500000004 HC RX 250 GENERAL PHARMACY W/ HCPCS (ALT 636 FOR OP/ED): Mod: SE

## 2025-04-28 PROCEDURE — RXMED WILLOW AMBULATORY MEDICATION CHARGE

## 2025-04-28 PROCEDURE — 99283 EMERGENCY DEPT VISIT LOW MDM: CPT | Mod: 25

## 2025-04-28 PROCEDURE — 12002 RPR S/N/AX/GEN/TRNK2.6-7.5CM: CPT

## 2025-04-28 RX ORDER — CEPHALEXIN 500 MG/1
500 CAPSULE ORAL 2 TIMES DAILY
Qty: 20 CAPSULE | Refills: 0 | Status: SHIPPED | OUTPATIENT
Start: 2025-04-28 | End: 2025-05-08

## 2025-04-28 RX ORDER — LIDOCAINE HYDROCHLORIDE 10 MG/ML
10 INJECTION, SOLUTION INFILTRATION; PERINEURAL ONCE
Status: COMPLETED | OUTPATIENT
Start: 2025-04-28 | End: 2025-04-28

## 2025-04-28 RX ADMIN — LIDOCAINE HYDROCHLORIDE 10 ML: 10 INJECTION, SOLUTION INFILTRATION; PERINEURAL at 17:49

## 2025-04-28 ASSESSMENT — PAIN SCALES - GENERAL
PAINLEVEL_OUTOF10: 0 - NO PAIN
PAINLEVEL_OUTOF10: 6

## 2025-04-28 ASSESSMENT — PAIN - FUNCTIONAL ASSESSMENT: PAIN_FUNCTIONAL_ASSESSMENT: 0-10

## 2025-04-28 ASSESSMENT — COLUMBIA-SUICIDE SEVERITY RATING SCALE - C-SSRS
2. HAVE YOU ACTUALLY HAD ANY THOUGHTS OF KILLING YOURSELF?: NO
6. HAVE YOU EVER DONE ANYTHING, STARTED TO DO ANYTHING, OR PREPARED TO DO ANYTHING TO END YOUR LIFE?: NO
1. IN THE PAST MONTH, HAVE YOU WISHED YOU WERE DEAD OR WISHED YOU COULD GO TO SLEEP AND NOT WAKE UP?: NO

## 2025-04-28 ASSESSMENT — PAIN DESCRIPTION - DESCRIPTORS: DESCRIPTORS: THROBBING

## 2025-04-28 ASSESSMENT — PAIN DESCRIPTION - LOCATION: LOCATION: FINGER (COMMENT WHICH ONE)

## 2025-04-28 ASSESSMENT — PAIN DESCRIPTION - ORIENTATION: ORIENTATION: LEFT

## 2025-04-28 ASSESSMENT — PAIN DESCRIPTION - FREQUENCY: FREQUENCY: CONSTANT/CONTINUOUS

## 2025-04-28 NOTE — ED PROCEDURE NOTE
Procedure  Laceration Repair    Performed by: NICOL Graff  Authorized by: Tc Tao MD    Consent:     Consent obtained:  Verbal    Consent given by:  Patient    Risks, benefits, and alternatives were discussed: yes      Risks discussed:  Infection, pain, need for additional repair and poor cosmetic result    Alternatives discussed:  No treatment  Universal protocol:     Patient identity confirmed:  Verbally with patient  Anesthesia:     Anesthesia method:  None  Laceration details:     Location:  Finger    Finger location:  L index finger    Length (cm):  3  Exploration:     Imaging outcome: foreign body not noted      Contaminated: no    Treatment:     Amount of cleaning:  Standard    Irrigation solution:  Sterile saline  Skin repair:     Repair method:  Sutures    Suture size:  5-0    Suture material:  Prolene    Suture technique:  Simple interrupted    Number of sutures:  5  Approximation:     Approximation:  Close  Post-procedure details:     Dressing:  Non-adherent dressing    Procedure completion:  Tolerated well, no immediate complications               NICOL Graff  04/28/25 [id number]

## 2025-04-28 NOTE — ED TRIAGE NOTES
Patient came to ED by  private car for laceration to left index finger while using a paring knife. Patient alert  and oriented x4. Skin warm and dry. Patient stated  that she is a diabetic.

## 2025-04-28 NOTE — ED PROVIDER NOTES
Limitations to history: None  Independent Historians: Family  External Records Reviewed: HIE, OARRS, outpatient notes, inpatient notes, paper charts if needed    History of Present Illness:  Patient is a 64-year-old female with past medical history positive for diabetes, COPD, thyroid disease, hyperlipidemia, hypertension, YESSY, MI who presents to ED chief complaint of a finger laceration.  Patient reports that she was using a knife, cutting a potato when she accidentally cut her left index finger.  Reports that her tetanus is up-to-date.  Denies any other injury and/or trauma.  Reports she is not on blood thinners.  Patient is alert and oriented x 3 upon examination otherwise no acute distress.    Denies HA, C/P, SOB, ABD pain, Nausea, Vomiting, Diarrhea, Weakness, Dizziness, Fever, Chills.    PMFSH:   As per HPI, otherwise nurses notes reviewed in EMR    Physical Exam:  Appearance: Alert, oriented x3, supine on exam table with head elevated, cooperative, in no acute distress. Well nourished & well hydrated.      Skin: Approximate 3 cm linear laceration noted to medial portion of left index finger.  Area is without evidence of foreign body noted, bleeding controlled.  Intact, dry skin, no lesions, rash, petechiae or purpura.     Eyes: PERRLA, EOMs intact, Conjunctiva pink with no redness or exudates. No scleral icterus.     Ears: Hearing grossly intact.      Nose: Nares patent, no epistaxis.     Mouth: Dentition without concerning abnormalities. no obstruction of posterior pharynx.     Neck: Supple, without meningismus. Trachea at midline.     Pulmonary: Clear bilaterally with good chest wall excursion. No rales, rhonchi or wheezing. No accessory muscle use or stridor. Talking in full sentences.     Cardiac: Normal S1, S2 without murmur, rub, gallop or extrasystole.     Abdomen: Soft, nontender to light and deep palpation to all quadrants, normoactive bowel sounds.  No palpable organomegaly.  No rebound or  guarding.     Genitourinary: Physical exam deferred.     Musculoskeletal: Normal gait. Full range of motion to all extremities. Rest of the exam reveals no pain on palpation, instability, or deformity. Pulses full and equal. No cyanosis or clubbing. capillary refill <2 seconds to all examined digits.     Neurological:  Cranial nerves II through XII are grossly intact, normal sensation, no weakness, no focal findings identified.      Psychiatric: Appropriate mood and affect.              Repeat Evaluation below    Summary:  Medical Decision Making:   Patient presented as described in HPI. Patient case including ROS, PE, and treatment and plan discussed with ED attending if attached as cosigner. Due to patients presentation orders completed include as documented.  Patient evaluated for complaints of a left index finger laceration.  Patient is found to be afebrile, nontachycardic nonhypoxic.  Patient is alert and orient x 3, denies any other complaints or injuries.  Laceration was thoroughly cleansed by nursing staff, patient reports that she is up-to-date on her tetanus.  Laceration was repaired with sutures while in ED.  Patient will be placed on prophylactic antibiotics due to history of diabetes.  Patient will be placed on Keflex, advised to follow-up with her provider in 7 to 10 days for suture removal.  Patient is aware to take Tylenol and or Motrin as needed for analgesia, keep the area clean, dry.  Patient was advised to follow up with PCP or recommended provider in 2-3 days for another evaluation and exam. I advised patient/guardian to return or go to closest emergency room immediately if symptoms change, get worse, new symptoms develop prior to follow up. If there is no improvement in symptoms in the next 24 hours they are advised to return for further evaluation and exam. I also explained the plan and treatment course. Patient/guardian is in agreement with plan, treatment course, and follow up and states  verbally that they will comply.          Tests/Medications/Escalations of Care considered but not given:    Patient care discussed with: N/A  Social Determinants affecting care: N/A    Final diagnosis and disposition as documented in impression    Homegoing. I discussed the differential; results and discharge plan with the patient and/or family/friend/caregiver if present.  I emphasized the importance of follow-up with the physician I referred them to in the timeframe recommended.  I explained reasons for the patient to return to the Emergency Department. They agreed that if they feel their condition is worsening or if they have any other concern they should call 911 immediately for further assistance. I gave the patient an opportunity to ask all questions they had and answered all of them accordingly. They understand return precautions and discharge instructions. The patient and/or family/friend/caregiver expressed understanding verbally and that they would comply.       Disposition:  Discharge         This note has been transcribed using voice recognition and may contain grammatical errors, misplaced words, incorrect words, incorrect phrases or other errors.     Maryjane Miller, JULES-ANDI  04/28/25 [id number]

## 2025-04-30 ENCOUNTER — PHARMACY VISIT (OUTPATIENT)
Dept: PHARMACY | Facility: CLINIC | Age: 65
End: 2025-04-30
Payer: MEDICAID

## 2025-05-06 ENCOUNTER — PHARMACY VISIT (OUTPATIENT)
Dept: PHARMACY | Facility: CLINIC | Age: 65
End: 2025-05-06
Payer: MEDICAID

## 2025-05-06 PROCEDURE — RXMED WILLOW AMBULATORY MEDICATION CHARGE

## 2025-05-06 RX ORDER — ALBUTEROL SULFATE 0.83 MG/ML
SOLUTION RESPIRATORY (INHALATION)
Qty: 180 ML | Refills: 3 | OUTPATIENT
Start: 2025-05-06

## 2025-05-06 RX ORDER — EMPAGLIFLOZIN 10 MG/1
10 TABLET, FILM COATED ORAL DAILY
Qty: 90 TABLET | Refills: 1 | OUTPATIENT
Start: 2025-05-06

## 2025-05-06 RX ORDER — HYDROXYZINE HYDROCHLORIDE 25 MG/1
TABLET, FILM COATED ORAL
Qty: 30 TABLET | Refills: 2 | OUTPATIENT
Start: 2025-05-06

## 2025-05-06 RX ORDER — ROPINIROLE 0.5 MG/1
0.5 TABLET, FILM COATED ORAL NIGHTLY
Qty: 90 TABLET | Refills: 1 | OUTPATIENT
Start: 2025-05-06

## 2025-05-06 RX ORDER — LISINOPRIL 10 MG/1
10 TABLET ORAL DAILY
Qty: 90 TABLET | Refills: 1 | OUTPATIENT
Start: 2025-05-06

## 2025-05-06 RX ORDER — ATORVASTATIN CALCIUM 40 MG/1
40 TABLET, FILM COATED ORAL NIGHTLY
Qty: 90 TABLET | Refills: 1 | OUTPATIENT
Start: 2025-05-06

## 2025-05-06 RX ORDER — AMLODIPINE BESYLATE 10 MG/1
10 TABLET ORAL DAILY
Qty: 90 TABLET | Refills: 1 | OUTPATIENT
Start: 2025-05-06

## 2025-05-06 RX ORDER — LORATADINE 10 MG/1
TABLET ORAL
Qty: 90 TABLET | Refills: 1 | OUTPATIENT
Start: 2025-05-06

## 2025-05-06 RX ORDER — ALBUTEROL SULFATE 90 UG/1
INHALANT RESPIRATORY (INHALATION)
Qty: 18 G | Refills: 2 | OUTPATIENT
Start: 2025-05-06

## 2025-05-19 ENCOUNTER — PHARMACY VISIT (OUTPATIENT)
Dept: PHARMACY | Facility: CLINIC | Age: 65
End: 2025-05-19
Payer: MEDICAID

## 2025-05-19 PROCEDURE — RXMED WILLOW AMBULATORY MEDICATION CHARGE

## 2025-05-29 ENCOUNTER — ANESTHESIA EVENT (OUTPATIENT)
Dept: GASTROENTEROLOGY | Facility: HOSPITAL | Age: 65
End: 2025-05-29
Payer: MEDICAID

## 2025-05-29 ENCOUNTER — APPOINTMENT (OUTPATIENT)
Dept: PREADMISSION TESTING | Facility: HOSPITAL | Age: 65
End: 2025-05-29
Payer: MEDICAID

## 2025-05-29 ENCOUNTER — PRE-ADMISSION TESTING (OUTPATIENT)
Dept: PREADMISSION TESTING | Facility: HOSPITAL | Age: 65
End: 2025-05-29
Payer: MEDICAID

## 2025-05-29 NOTE — ANESTHESIA PREPROCEDURE EVALUATION
Patient: Marina Collazo    Procedure Information       Date/Time: 06/19/25 0800    Scheduled providers: Jermaine Oakley MD    Procedure: COLONOSCOPY    Location: [facility]            Relevant Problems   Cardiac   (+) Hyperlipidemia   (+) Hypertension   (+) MI (myocardial infarction) (Multi)      Pulmonary   (+) COPD (chronic obstructive pulmonary disease) (Multi)      Endocrine   (+) Hypothyroidism, postablative   (+) Obesity       Clinical information reviewed:               There were no vitals filed for this visit.    Surgical History[1]  Medical History[2]  Current Medications[3]  Prior to Admission medications    Medication Sig Start Date End Date Taking? Authorizing Provider   albuterol (Ventolin HFA) 90 mcg/actuation inhaler Inhale.    Historical Provider, MD   albuterol 2.5 mg /3 mL (0.083 %) nebulizer solution Take 3 mL (2.5 mg) by nebulization every 6 hours if needed for wheezing.    Historical Provider, MD   albuterol 2.5 mg /3 mL (0.083 %) nebulizer solution Use 1 vial every eight hours for breathing treatments as needed as directed 5/6/25      albuterol 90 mcg/actuation inhaler Inhale 1-2 puff four times a day as needed as directed 5/6/25      amLODIPine (Norvasc) 10 mg tablet Take 1 tablet (10 mg) by mouth once daily. 1/27/21   Historical Provider, MD   amLODIPine (Norvasc) 10 mg tablet Take 1 tablet by mouth once a day 5/6/25      aspirin 81 mg EC tablet Take 1 tablet (81 mg) by mouth once daily. 1/27/21   Historical Provider, MD   atorvastatin (Lipitor) 40 mg tablet take 1 tablet by mouth every evening 5/6/25      atorvastatin (Lipitor) 80 mg tablet Take 1 tablet (80 mg) by mouth once daily at bedtime. 1/27/21   Historical Provider, MD   blood-glucose sensor (Dexcom G7 Sensor) device for continuous glucose monitoring, change every 10 days 3/28/24   Pollo Graves MD   cetirizine (ZyrTEC) 10 mg capsule Take by mouth.    Historical Provider, MD   Clenpiq 10 mg-3.5 gram- 12 gram/175  mL solution Take 2 bottles by mouth if needed (Kit prep take one bottle at 6pm night before procedure and take second bottle at 4 am day of procedure). 9/5/24   Jermaine Oakley MD   empagliflozin (Jardiance) 10 mg tablet Take 1 tablet by mouth once a day 5/6/25      hydrOXYzine HCL (Atarax) 25 mg tablet Take 1 tablet by mouth at bedtime as needed for sleep 5/6/25      Jardiance 10 mg Take 1 tablet (10 mg) by mouth once daily. 8/13/24 8/13/25  Pollo Graves MD   levothyroxine (Synthroid, Levoxyl) 25 mcg tablet TAKE 1 TABLET BY MOUTH ON TUESDAY , THURSDAY AND SATURDAY 4/27/25   Pollo Graves MD   levothyroxine (Synthroid, Levoxyl) 50 mcg tablet TAKE 1 TABLET BY MOUTH ON MONDAY, WEDNESDAY, FRIDAY AND SUNDAY 4/27/25   Pollo Graves MD   lisinopril 10 mg tablet Take 1 tablet (10 mg) by mouth once daily. 1/27/21   Historical Provider, MD   lisinopril 10 mg tablet Take 1 tablet by mouth once a day 5/6/25      loratadine (Claritin) 10 mg tablet take 1 tablet by mouth once daily as needed allergies 5/6/25      metoprolol succinate XL (Toprol-XL) 25 mg 24 hr tablet Take 1 tablet (25 mg) by mouth once daily. 1/27/21   Historical Provider, MD   rOPINIRole (Requip) 0.5 mg tablet Take 1 tablet (0.5 mg) by mouth once daily.  Patient taking differently: Take 1 tablet (0.5 mg) by mouth once daily. Taking 0.5 mg at 5 pm and 0.5 mg at 8 pm    Historical Provider, MD   rOPINIRole (Requip) 0.5 mg tablet Take 1 tablet by mouth once daily every night 5/6/25        RX Allergies[4]  Social History     Tobacco Use    Smoking status: Every Day     Current packs/day: 0.25     Average packs/day: 0.3 packs/day for 2.0 years (0.5 ttl pk-yrs)     Types: Cigarettes     Start date: 6/1/2023    Smokeless tobacco: Never    Tobacco comments:     Current smoking 1-2 cigs/day   Substance Use Topics    Alcohol use: Yes     Comment: rare, social         Chemistry    Lab Results   Component Value Date/Time     12/12/2022 2211    K 3.8  12/12/2022 2211     12/12/2022 2211    CO2 25 12/12/2022 2211    BUN 14 12/12/2022 2211    CREATININE 0.80 12/12/2022 2211    Lab Results   Component Value Date/Time    CALCIUM 9.0 12/12/2022 2211    ALKPHOS 80 12/12/2022 2211    AST 11 12/12/2022 2211    ALT 14 12/12/2022 2211    BILITOT 0.3 12/12/2022 2211          Lab Results   Component Value Date/Time    WBC 11.2 12/12/2022 2211    HGB 13.1 12/12/2022 2211    HCT 39.7 12/12/2022 2211     12/12/2022 2211     Lab Results   Component Value Date/Time    PROTIME 13.6 (H) 01/17/2021 0013    INR 1.2 (H) 01/17/2021 0013        Chart reviewed. Cardiac clearance received 5/22/25.       NPO Detail:  No data recorded     Physical Exam    Airway  Mallampati: II  Mouth opening: 3 or more finger widths     Cardiovascular - normal exam   Dental     (+) upper dentures, lower dentures     Pulmonary (+) decreased breath sounds     Abdominal            Anesthesia Plan    History of general anesthesia?: yes  History of complications of general anesthesia?: no    ASA 3     MAC     The patient is a current smoker.  Patient was previously instructed to abstain from smoking on day of procedure.  Patient smoked on day of procedure.    intravenous induction   Anesthetic plan and risks discussed with patient.             [1]   Past Surgical History:  Procedure Laterality Date    APPENDECTOMY  05/15/2014    Appendectomy    CARDIAC CATHETERIZATION      CAROTID STENT      2- Oct 2023    MULTIPLE TOOTH EXTRACTIONS Bilateral     OTHER SURGICAL HISTORY Left 05/15/2020    Foot fracture repair-ORIF   [2]   Past Medical History:  Diagnosis Date    COPD (chronic obstructive pulmonary disease) (Multi)     Disease of thyroid gland     Hyperlipidemia     Hypertension     Myocardial infarction (Multi)     Personal history of other diseases of the respiratory system 05/15/2014    Personal history of asthma    Personal history of other endocrine, nutritional and metabolic disease 05/15/2014     History of Graves' disease    Pneumonia     Posttraumatic deformity of nasal septum     Sleep apnea     No CPAP    Type 2 diabetes mellitus without complications 07/01/2015    Diabetes mellitus   [3]   Current Outpatient Medications:     albuterol (Ventolin HFA) 90 mcg/actuation inhaler, Inhale., Disp: , Rfl:     albuterol 2.5 mg /3 mL (0.083 %) nebulizer solution, Take 3 mL (2.5 mg) by nebulization every 6 hours if needed for wheezing., Disp: , Rfl:     albuterol 2.5 mg /3 mL (0.083 %) nebulizer solution, Use 1 vial every eight hours for breathing treatments as needed as directed, Disp: 180 mL, Rfl: 3    albuterol 90 mcg/actuation inhaler, Inhale 1-2 puff four times a day as needed as directed, Disp: 18 g, Rfl: 2    amLODIPine (Norvasc) 10 mg tablet, Take 1 tablet (10 mg) by mouth once daily., Disp: , Rfl:     amLODIPine (Norvasc) 10 mg tablet, Take 1 tablet by mouth once a day, Disp: 90 tablet, Rfl: 1    aspirin 81 mg EC tablet, Take 1 tablet (81 mg) by mouth once daily., Disp: , Rfl:     atorvastatin (Lipitor) 40 mg tablet, take 1 tablet by mouth every evening, Disp: 90 tablet, Rfl: 1    atorvastatin (Lipitor) 80 mg tablet, Take 1 tablet (80 mg) by mouth once daily at bedtime., Disp: , Rfl:     blood-glucose sensor (Dexcom G7 Sensor) device, for continuous glucose monitoring, change every 10 days, Disp: 9 each, Rfl: 3    cetirizine (ZyrTEC) 10 mg capsule, Take by mouth., Disp: , Rfl:     Clenpiq 10 mg-3.5 gram- 12 gram/175 mL solution, Take 2 bottles by mouth if needed (Kit prep take one bottle at 6pm night before procedure and take second bottle at 4 am day of procedure)., Disp: 350 mL, Rfl: 0    empagliflozin (Jardiance) 10 mg tablet, Take 1 tablet by mouth once a day, Disp: 90 tablet, Rfl: 1    hydrOXYzine HCL (Atarax) 25 mg tablet, Take 1 tablet by mouth at bedtime as needed for sleep, Disp: 30 tablet, Rfl: 2    Jardiance 10 mg, Take 1 tablet (10 mg) by mouth once daily., Disp: 30 tablet, Rfl: 11     levothyroxine (Synthroid, Levoxyl) 25 mcg tablet, TAKE 1 TABLET BY MOUTH ON TUESDAY , THURSDAY AND SATURDAY, Disp: 12 tablet, Rfl: 11    levothyroxine (Synthroid, Levoxyl) 50 mcg tablet, TAKE 1 TABLET BY MOUTH ON MONDAY, WEDNESDAY, FRIDAY AND SUNDAY, Disp: 18 tablet, Rfl: 11    lisinopril 10 mg tablet, Take 1 tablet (10 mg) by mouth once daily., Disp: , Rfl:     lisinopril 10 mg tablet, Take 1 tablet by mouth once a day, Disp: 90 tablet, Rfl: 1    loratadine (Claritin) 10 mg tablet, take 1 tablet by mouth once daily as needed allergies, Disp: 90 tablet, Rfl: 1    metoprolol succinate XL (Toprol-XL) 25 mg 24 hr tablet, Take 1 tablet (25 mg) by mouth once daily., Disp: , Rfl:     rOPINIRole (Requip) 0.5 mg tablet, Take 1 tablet (0.5 mg) by mouth once daily. (Patient taking differently: Take 1 tablet (0.5 mg) by mouth once daily. Taking 0.5 mg at 5 pm and 0.5 mg at 8 pm), Disp: , Rfl:     rOPINIRole (Requip) 0.5 mg tablet, Take 1 tablet by mouth once daily every night, Disp: 90 tablet, Rfl: 1  [4]   Allergies  Allergen Reactions    Chlorpheniram-Dm-Acetaminophen Anaphylaxis    Codeine Anaphylaxis    Doxylamine-Dm-Acetaminophen Anaphylaxis     High B/P, coma    Lorazepam Hives and Swelling    Penicillins Anaphylaxis    Propoxyphene Anaphylaxis    Jardiance [Empagliflozin] Nausea/vomiting     Higher than 5 mg    Metformin Nausea/vomiting

## 2025-06-02 ENCOUNTER — PRE-ADMISSION TESTING (OUTPATIENT)
Dept: PREADMISSION TESTING | Facility: HOSPITAL | Age: 65
End: 2025-06-02
Payer: MEDICAID

## 2025-06-09 ENCOUNTER — PRE-ADMISSION TESTING (OUTPATIENT)
Dept: PREADMISSION TESTING | Facility: HOSPITAL | Age: 65
End: 2025-06-09
Payer: MEDICAID

## 2025-06-11 ENCOUNTER — PRE-ADMISSION TESTING (OUTPATIENT)
Dept: PREADMISSION TESTING | Facility: HOSPITAL | Age: 65
End: 2025-06-11
Payer: MEDICAID

## 2025-06-11 VITALS — WEIGHT: 157 LBS | HEIGHT: 67 IN | BODY MASS INDEX: 24.64 KG/M2

## 2025-06-11 ASSESSMENT — DUKE ACTIVITY SCORE INDEX (DASI)
CAN YOU PARTICIPATE IN STRENOUS SPORTS LIKE SWIMMING, SINGLES TENNIS, FOOTBALL, BASKETBALL, OR SKIING: NO
CAN YOU CLIMB A FLIGHT OF STAIRS OR WALK UP A HILL: YES
CAN YOU TAKE CARE OF YOURSELF (EAT, DRESS, BATHE, OR USE TOILET): YES
CAN YOU DO HEAVY WORK AROUND THE HOUSE LIKE SCRUBBING FLOORS OR LIFTING AND MOVING HEAVY FURNITURE: YES
CAN YOU RUN A SHORT DISTANCE: YES
CAN YOU DO MODERATE WORK AROUND THE HOUSE LIKE VACUUMING, SWEEPING FLOORS OR CARRYING GROCERIES: YES
CAN YOU DO LIGHT WORK AROUND THE HOUSE LIKE DUSTING OR WASHING DISHES: YES
CAN YOU WALK A BLOCK OR TWO ON LEVEL GROUND: YES
CAN YOU WALK INDOORS, SUCH AS AROUND YOUR HOUSE: YES
CAN YOU PARTICIPATE IN MODERATE RECREATIONAL ACTIVITIES LIKE GOLF, BOWLING, DANCING, DOUBLES TENNIS OR THROWING A BASEBALL OR FOOTBALL: NO
CAN YOU DO YARD WORK LIKE RAKING LEAVES, WEEDING OR PUSHING A MOWER: YES

## 2025-06-11 NOTE — PREPROCEDURE INSTRUCTIONS
Medication List            Accurate as of June 11, 2025  8:56 AM. Always use your most recent med list.                * amLODIPine 10 mg tablet  Commonly known as: Norvasc  Medication Adjustments for Surgery: Take on the morning of surgery     * amLODIPine 10 mg tablet  Commonly known as: Norvasc  Take 1 tablet by mouth once a day     aspirin 81 mg EC tablet  Medication Adjustments for Surgery: Take last dose 1 day (24 hours) before surgery     * atorvastatin 80 mg tablet  Commonly known as: Lipitor  Medication Adjustments for Surgery: Take last dose 1 day (24 hours) before surgery     * atorvastatin 40 mg tablet  Commonly known as: Lipitor  take 1 tablet by mouth every evening     Clenpiq 10 mg-3.5 gram- 12 gram/175 mL solution  Generic drug: sod picosulf-mag ox-citric ac  Take 2 bottles by mouth if needed (Kit prep take one bottle at 6pm night before procedure and take second bottle at 4 am day of procedure).  Medication Adjustments for Surgery: Take/Use as prescribed     Dexcom G7 Sensor device  Generic drug: blood-glucose sensor  for continuous glucose monitoring, change every 10 days     hydrOXYzine HCL 25 mg tablet  Commonly known as: Atarax  Take 1 tablet by mouth at bedtime as needed for sleep  Medication Adjustments for Surgery: Take/Use as prescribed     * Jardiance 10 mg tablet  Generic drug: empagliflozin  Take 1 tablet (10 mg) by mouth once daily.  Medication Adjustments for Surgery: Take last dose 3 days before surgery     * Jardiance 10 mg tablet  Generic drug: empagliflozin  Take 1 tablet by mouth once a day  Medication Adjustments for Surgery: Take last dose 3 days before surgery     * levothyroxine 25 mcg tablet  Commonly known as: Synthroid, Levoxyl  TAKE 1 TABLET BY MOUTH ON TUESDAY , THURSDAY AND SATURDAY  Medication Adjustments for Surgery: Take on the morning of surgery     * levothyroxine 50 mcg tablet  Commonly known as: Synthroid, Levoxyl  TAKE 1 TABLET BY MOUTH ON MONDAY, WEDNESDAY,  FRIDAY AND SUNDAY  Medication Adjustments for Surgery: Take last dose 1 day (24 hours) before surgery     * lisinopril 10 mg tablet  Medication Adjustments for Surgery: Take last dose 1 day (24 hours) before surgery     * lisinopril 10 mg tablet  Take 1 tablet by mouth once a day  Medication Adjustments for Surgery: Take last dose 1 day (24 hours) before surgery     loratadine 10 mg tablet  Commonly known as: Claritin  take 1 tablet by mouth once daily as needed allergies  Medication Adjustments for Surgery: Take last dose 1 day (24 hours) before surgery     metoprolol succinate XL 25 mg 24 hr tablet  Commonly known as: Toprol-XL  Medication Adjustments for Surgery: Take on the morning of surgery     * rOPINIRole 0.5 mg tablet  Commonly known as: Requip  Medication Adjustments for Surgery: Take last dose 1 day (24 hours) before surgery     * rOPINIRole 0.5 mg tablet  Commonly known as: Requip  Take 1 tablet by mouth once daily every night  Medication Adjustments for Surgery: Take last dose 1 day (24 hours) before surgery     * Ventolin HFA 90 mcg/actuation inhaler  Generic drug: albuterol  Medication Adjustments for Surgery: Take/Use as prescribed     * albuterol 2.5 mg /3 mL (0.083 %) nebulizer solution  Medication Adjustments for Surgery: Take/Use as prescribed     * albuterol 2.5 mg /3 mL (0.083 %) nebulizer solution  Use 1 vial every eight hours for breathing treatments as needed as directed  Medication Adjustments for Surgery: Take/Use as prescribed     * albuterol 90 mcg/actuation inhaler  Inhale 1-2 puff four times a day as needed as directed  Medication Adjustments for Surgery: Take/Use as prescribed     ZyrTEC 10 mg capsule  Generic drug: cetirizine  Medication Adjustments for Surgery: Take last dose 1 day (24 hours) before surgery           * This list has 16 medication(s) that are the same as other medications prescribed for you. Read the directions carefully, and ask your doctor or other care provider to  review them with you.                                  NPO Instructions:    Follow instructions:  5 days before your procedure eat a low fiber diet. No seeds, nuts, beans, raw veggies, corn, popcorn or whole grains.  Day before procedure-may have light breakfast by 9am (ex. 1 egg, 1 piece of toast).  Then CLEAR LIQUIDS ONLY-No dairy products, nothing red/purple.  Take first part of prep- Evening Before Procedure.  Drink lots of liquids.  Day of Procedure- 3-4am Take Second Part of Prep.    After Midnight the only Clear Liquid allowed is: Water, Black Coffee, Tea, Gatorade and Clear Soda.  STOP DRINKING 3 HOURS PRIOR TO PROCEDURE.  Take medications as instructed.   No Gum, Candy, Mints or Smoking day of the procedure!     Additional Instructions:     Review your medication instructions, take indicated medications  Wear  comfortable loose fitting clothing  All jewelry and valuables should be left at home    Park in back of hospital by ER. Come up to Second floor-Outpt dept to check in.  Bring Photo ID and Insurance card,   You MUST have a  with you.  No more than 2 visitors with you please.  There is construction around the hospital- best to take [address] [facility].     If you get ill at all before your procedure- CALL YOUR DOCTOR/SURGEON.  We want you in the best shape that is possible. Any sickness might lead to your procedure being delayed.      Call Outpatient dept at [phone] the night before your procedure (Friday for Monday procedure), between 1-3 pm.     **If you start any new medications, especially for weight loss or antibiotics-let us know. Outpatient dept number is [phone] (M-F 7-3:30p).      Remember to hold Jardiance 3 days prior (Monday, June 16).

## 2025-06-17 ENCOUNTER — OFFICE VISIT (OUTPATIENT)
Dept: CARDIOLOGY | Facility: CLINIC | Age: 65
End: 2025-06-17
Payer: MEDICAID

## 2025-06-17 VITALS
OXYGEN SATURATION: 95 % | WEIGHT: 172 LBS | DIASTOLIC BLOOD PRESSURE: 76 MMHG | HEART RATE: 90 BPM | SYSTOLIC BLOOD PRESSURE: 132 MMHG | BODY MASS INDEX: 26.94 KG/M2

## 2025-06-17 DIAGNOSIS — I10 HYPERTENSION, UNSPECIFIED TYPE: ICD-10-CM

## 2025-06-17 DIAGNOSIS — I25.10 ARTERIOSCLEROTIC CARDIOVASCULAR DISEASE: ICD-10-CM

## 2025-06-17 DIAGNOSIS — Z01.810 PREOP CARDIOVASCULAR EXAM: Primary | ICD-10-CM

## 2025-06-17 DIAGNOSIS — E78.5 HYPERLIPIDEMIA, UNSPECIFIED HYPERLIPIDEMIA TYPE: ICD-10-CM

## 2025-06-17 LAB
ATRIAL RATE: 91 BPM
P AXIS: 36 DEGREES
P OFFSET: 198 MS
P ONSET: 126 MS
PR INTERVAL: 182 MS
Q ONSET: 217 MS
QRS COUNT: 15 BEATS
QRS DURATION: 90 MS
QT INTERVAL: 388 MS
QTC CALCULATION(BAZETT): 477 MS
QTC FREDERICIA: 446 MS
R AXIS: 62 DEGREES
T AXIS: 64 DEGREES
T OFFSET: 411 MS
VENTRICULAR RATE: 91 BPM

## 2025-06-17 PROCEDURE — 99214 OFFICE O/P EST MOD 30 MIN: CPT | Performed by: NURSE PRACTITIONER

## 2025-06-17 PROCEDURE — 3075F SYST BP GE 130 - 139MM HG: CPT | Performed by: NURSE PRACTITIONER

## 2025-06-17 PROCEDURE — 3078F DIAST BP <80 MM HG: CPT | Performed by: NURSE PRACTITIONER

## 2025-06-17 PROCEDURE — 99212 OFFICE O/P EST SF 10 MIN: CPT | Performed by: NURSE PRACTITIONER

## 2025-06-17 PROCEDURE — 93005 ELECTROCARDIOGRAM TRACING: CPT | Performed by: NURSE PRACTITIONER

## 2025-06-17 PROCEDURE — 4010F ACE/ARB THERAPY RXD/TAKEN: CPT | Performed by: NURSE PRACTITIONER

## 2025-06-17 ASSESSMENT — ENCOUNTER SYMPTOMS
ENDOCRINE NEGATIVE: 1
PSYCHIATRIC NEGATIVE: 1
CONSTITUTIONAL NEGATIVE: 1
RESPIRATORY NEGATIVE: 1
CARDIOVASCULAR NEGATIVE: 1
EYES NEGATIVE: 1
MYALGIAS: 1
DEPRESSION: 0
GASTROINTESTINAL NEGATIVE: 1
NEUROLOGICAL NEGATIVE: 1
HEMATOLOGIC/LYMPHATIC NEGATIVE: 1

## 2025-06-17 NOTE — PROGRESS NOTES
Referred by Dr. Franco for Pre-op Clearance (Routine colonoscopy.)     History Of Present Illness:    Marina Collazo is a very pleasant 65 year old female with a history of HTN, HLD and CAD, she is here for a follow up visit. The patient is seen in collaboration with Dr. Resendez. She is here for perioperative cardiac clearance for colonoscopy. Denies chest pain or shortness of breath. Continues to stay active without limitations. Able to walk up a flight of stairs without dyspnea.     Review of Systems   Constitutional: Negative.   HENT: Negative.     Eyes: Negative.    Cardiovascular: Negative.    Respiratory: Negative.     Endocrine: Negative.    Hematologic/Lymphatic: Negative.    Skin: Negative.    Musculoskeletal:  Positive for muscle weakness and myalgias.   Gastrointestinal: Negative.    Neurological: Negative.    Psychiatric/Behavioral: Negative.        Past Medical History:  She has a past medical history of COPD (chronic obstructive pulmonary disease) (Multi), Disease of thyroid gland, Hyperlipidemia, Hypertension, Hypothyroidism, Myocardial infarction (Multi), Personal history of other diseases of the respiratory system (05/15/2014), Personal history of other endocrine, nutritional and metabolic disease (05/15/2014), Pneumonia, Posttraumatic deformity of nasal septum, Sleep apnea, and Type 2 diabetes mellitus without complications (07/01/2015).    She has no past medical history of Personal history of irradiation.    Past Surgical History:  She has a past surgical history that includes Appendectomy (05/15/2014); Other surgical history (Left, 05/15/2020); Cardiac catheterization; Carotid stent; and Multiple tooth extractions (Bilateral).      Social History:  She reports that she has been smoking cigarettes. She started smoking about 2 years ago. She has a 0.5 pack-year smoking history. She has never used smokeless tobacco. She reports current alcohol use. She reports that she does not use  drugs.    Family History:  Family History   Problem Relation Name Age of Onset    Stroke Mother      Sleep apnea Daughter          Allergies:  Chlorpheniram-dm-acetaminophen, Codeine, Doxylamine-dm-acetaminophen, Lorazepam, Penicillins, Propoxyphene, Jardiance [empagliflozin], and Metformin    Outpatient Medications:  Current Outpatient Medications   Medication Instructions    albuterol (Ventolin HFA) 90 mcg/actuation inhaler Inhale.    albuterol 2.5 mg /3 mL (0.083 %) nebulizer solution Use 1 vial every eight hours for breathing treatments as needed as directed    albuterol 90 mcg/actuation inhaler Inhale 1-2 puff four times a day as needed as directed    albuterol 2.5 mg, Every 6 hours PRN    amLODIPine (Norvasc) 10 mg tablet 1 tablet, Daily    amLODIPine (Norvasc) 10 mg tablet Take 1 tablet by mouth once a day    aspirin 81 mg EC tablet 1 tablet, Daily    atorvastatin (Lipitor) 40 mg tablet take 1 tablet by mouth every evening    blood-glucose sensor (Dexcom G7 Sensor) device for continuous glucose monitoring, change every 10 days    cetirizine (ZyrTEC) 10 mg capsule Take by mouth.    Clenpiq 10 mg-3.5 gram- 12 gram/175 mL solution 2 bottles, oral, As needed    empagliflozin (Jardiance) 10 mg tablet Take 1 tablet by mouth once a day    hydrOXYzine HCL (Atarax) 25 mg tablet Take 1 tablet by mouth at bedtime as needed for sleep    Jardiance 10 mg, oral, Daily    levothyroxine (Synthroid, Levoxyl) 25 mcg tablet TAKE 1 TABLET BY MOUTH ON TUESDAY , THURSDAY AND SATURDAY    levothyroxine (Synthroid, Levoxyl) 50 mcg tablet TAKE 1 TABLET BY MOUTH ON MONDAY, WEDNESDAY, FRIDAY AND SUNDAY    lisinopril 10 mg tablet 1 tablet, Daily    lisinopril 10 mg tablet Take 1 tablet by mouth once a day    loratadine (Claritin) 10 mg tablet take 1 tablet by mouth once daily as needed allergies    metoprolol succinate XL (Toprol-XL) 25 mg 24 hr tablet 1 tablet, Daily    rOPINIRole (Requip) 0.5 mg tablet 1 tablet, Daily    rOPINIRole  (Requip) 0.5 mg tablet Take 1 tablet by mouth once daily every night        Last Recorded Vitals:  Vitals:    06/17/25 1053   BP: 132/76   Pulse: 90   SpO2: 95%   Weight: 78 kg (172 lb)       Physical Exam:  Physical Exam  Vitals reviewed.   HENT:      Head: Normocephalic.      Nose: Nose normal.   Eyes:      Pupils: Pupils are equal, round, and reactive to light.   Cardiovascular:      Rate and Rhythm: Normal rate and regular rhythm.   Pulmonary:      Effort: Pulmonary effort is normal.      Breath sounds: Normal breath sounds.   Abdominal:      General: Abdomen is flat.      Palpations: Abdomen is soft.   Musculoskeletal:         General: Normal range of motion.      Cervical back: Normal range of motion.   Skin:     General: Skin is warm and dry.   Neurological:      General: No focal deficit present.      Mental Status: He is alert and oriented to person, place, and time.   Psychiatric:         Mood and Affect: Mood normal.            Last Labs:  CBC -  Lab Results   Component Value Date    WBC 11.2 12/12/2022    HGB 13.1 12/12/2022    HCT 39.7 12/12/2022    MCV 95 12/12/2022     12/12/2022       CMP -  Lab Results   Component Value Date    CALCIUM 9.0 12/12/2022    PROT 6.3 (L) 12/12/2022    ALBUMIN 4.1 12/12/2022    AST 11 12/12/2022    ALT 14 12/12/2022    ALKPHOS 80 12/12/2022    BILITOT 0.3 12/12/2022       LIPID PANEL -   Lab Results   Component Value Date    CHOL 203 (H) 01/18/2021    TRIG 293 (H) 01/18/2021    HDL 41.0 01/18/2021    CHHDL 5.0 01/18/2021    LDLF 103 (H) 01/18/2021    VLDL 59 (H) 01/18/2021    NHDL 162 01/18/2021       RENAL FUNCTION PANEL -   Lab Results   Component Value Date    GLUCOSE 99 12/12/2022     12/12/2022    K 3.8 12/12/2022     12/12/2022    CO2 25 12/12/2022    ANIONGAP 11 12/12/2022    BUN 14 12/12/2022    CREATININE 0.80 12/12/2022    CALCIUM 9.0 12/12/2022    ALBUMIN 4.1 12/12/2022        Lab Results   Component Value Date     (H) 01/17/2021     HGBA1C 6.7 (A) 08/13/2024       Last Cardiology Tests:  ECG:  EKG independently reviewed from today showed sinus rhythm with PVCs heart rate 91 bpm     Echo:  Echocardiogram 1/18/2021  1. The left ventricular systolic function is normal with a 55-60% estimated  ejection fraction.  2. Basal inferior segment is abnormal.  3. Spectral Doppler shows an impaired relaxation pattern of left ventricular  diastolic filling.  4. There is mild mitral regurgitation.  Ejection Fractions:  LVEF 55-60%  Cath:    Left heart cath 1/18/2021  1. Single vessel coronary artery disease without proximal left anterior  descending involvement.  2. Culprit vessel(s): right coronary artery.  3. S/P Successful drug eluting stent PCI to both proximal and mid RCA critical  lesions with 0% residual stenosis and BLANE 3 flow.  Stress Test:    Cardiac Imaging:      Assessment/Plan   Mrs. Collazo is a very pleasant 65 year old female with a history of DM, HTN, HLD and CAD, she was admitted with an NSTEMI (1/2021)with a PCI to the prox and mid RCA.  She is here for perioperative cardiac clearance for colonscopy. EKG shows sinus rhythm with PVCs. She is able to walk without dyspnea. She is low cardiac risk for surgery and may proceed to the OR without further cardiac testing.  She continues to walk on a regular basis. Blood pressure and heart rate are well controlled today.     Plan   -call with any questions   -follow up in one year   -continue Aspirin 81 mg daily indefinitely  -continue Metoprolol ER 25 mg daily, Atorvastatin 40 mg daily, and Lisinopril 10 mg daily   -continue Norvasc 10 mg daily and Jardiance 10 mg daily  -smoking cessation was strongly encouraged   -low cardiac risk for surgery   -may not stop Aspirin         Chanel Shaver, APRN-CNP

## 2025-06-19 ENCOUNTER — ANESTHESIA (OUTPATIENT)
Dept: GASTROENTEROLOGY | Facility: HOSPITAL | Age: 65
End: 2025-06-19
Payer: MEDICAID

## 2025-06-19 ENCOUNTER — PHARMACY VISIT (OUTPATIENT)
Dept: PHARMACY | Facility: CLINIC | Age: 65
End: 2025-06-19
Payer: MEDICAID

## 2025-06-19 ENCOUNTER — HOSPITAL ENCOUNTER (OUTPATIENT)
Dept: GASTROENTEROLOGY | Facility: HOSPITAL | Age: 65
Discharge: HOME | End: 2025-06-19
Payer: MEDICAID

## 2025-06-19 VITALS
TEMPERATURE: 97.3 F | HEIGHT: 67 IN | DIASTOLIC BLOOD PRESSURE: 77 MMHG | HEART RATE: 63 BPM | BODY MASS INDEX: 27 KG/M2 | SYSTOLIC BLOOD PRESSURE: 124 MMHG | RESPIRATION RATE: 17 BRPM | WEIGHT: 172 LBS | OXYGEN SATURATION: 97 %

## 2025-06-19 DIAGNOSIS — Z12.11 SPECIAL SCREENING FOR MALIGNANT NEOPLASM OF COLON: ICD-10-CM

## 2025-06-19 DIAGNOSIS — Z12.11 SPECIAL SCREENING FOR MALIGNANT NEOPLASMS, COLON: ICD-10-CM

## 2025-06-19 DIAGNOSIS — F17.210 CIGARETTE NICOTINE DEPENDENCE WITHOUT COMPLICATION: Primary | ICD-10-CM

## 2025-06-19 DIAGNOSIS — Z12.11 SCREEN FOR COLON CANCER: ICD-10-CM

## 2025-06-19 PROCEDURE — 2720000007 HC OR 272 NO HCPCS

## 2025-06-19 PROCEDURE — 7100000010 HC PHASE TWO TIME - EACH INCREMENTAL 1 MINUTE

## 2025-06-19 PROCEDURE — 7100000009 HC PHASE TWO TIME - INITIAL BASE CHARGE

## 2025-06-19 PROCEDURE — 2500000004 HC RX 250 GENERAL PHARMACY W/ HCPCS (ALT 636 FOR OP/ED): Mod: SE | Performed by: NURSE ANESTHETIST, CERTIFIED REGISTERED

## 2025-06-19 PROCEDURE — 3700000001 HC GENERAL ANESTHESIA TIME - INITIAL BASE CHARGE

## 2025-06-19 PROCEDURE — RXMED WILLOW AMBULATORY MEDICATION CHARGE

## 2025-06-19 PROCEDURE — 45385 COLONOSCOPY W/LESION REMOVAL: CPT | Performed by: SURGERY

## 2025-06-19 PROCEDURE — 3700000002 HC GENERAL ANESTHESIA TIME - EACH INCREMENTAL 1 MINUTE

## 2025-06-19 PROCEDURE — 45381 COLONOSCOPY SUBMUCOUS NJX: CPT | Performed by: SURGERY

## 2025-06-19 RX ORDER — IBUPROFEN 200 MG
TABLET ORAL
Qty: 42 PATCH | Refills: 0 | Status: SHIPPED | OUTPATIENT
Start: 2025-06-19 | End: 2025-08-14

## 2025-06-19 RX ORDER — NICOTINE 7MG/24HR
PATCH, TRANSDERMAL 24 HOURS TRANSDERMAL
Qty: 7 PATCH | Refills: 0 | OUTPATIENT
Start: 2025-06-19

## 2025-06-19 RX ORDER — PROPOFOL 10 MG/ML
INJECTION, EMULSION INTRAVENOUS AS NEEDED
Status: DISCONTINUED | OUTPATIENT
Start: 2025-06-19 | End: 2025-06-19

## 2025-06-19 RX ORDER — IBUPROFEN 200 MG
TABLET ORAL
Qty: 7 PATCH | Refills: 0 | OUTPATIENT
Start: 2025-06-19

## 2025-06-19 RX ORDER — SODIUM PICOSULFATE, MAGNESIUM OXIDE, AND ANHYDROUS CITRIC ACID 12; 3.5; 1 G/175ML; G/175ML; MG/175ML
2 LIQUID ORAL AS NEEDED
Qty: 350 ML | Refills: 0 | Status: SHIPPED | OUTPATIENT
Start: 2025-06-19

## 2025-06-19 RX ADMIN — PROPOFOL 20 MG: 10 INJECTION, EMULSION INTRAVENOUS at 07:51

## 2025-06-19 RX ADMIN — PROPOFOL 20 MG: 10 INJECTION, EMULSION INTRAVENOUS at 08:08

## 2025-06-19 RX ADMIN — PROPOFOL 20 MG: 10 INJECTION, EMULSION INTRAVENOUS at 08:03

## 2025-06-19 RX ADMIN — PROPOFOL 20 MG: 10 INJECTION, EMULSION INTRAVENOUS at 08:06

## 2025-06-19 RX ADMIN — PROPOFOL 20 MG: 10 INJECTION, EMULSION INTRAVENOUS at 08:01

## 2025-06-19 RX ADMIN — PROPOFOL 20 MG: 10 INJECTION, EMULSION INTRAVENOUS at 07:56

## 2025-06-19 RX ADMIN — PROPOFOL 30 MG: 10 INJECTION, EMULSION INTRAVENOUS at 07:46

## 2025-06-19 RX ADMIN — PROPOFOL 20 MG: 10 INJECTION, EMULSION INTRAVENOUS at 08:05

## 2025-06-19 RX ADMIN — PROPOFOL 20 MG: 10 INJECTION, EMULSION INTRAVENOUS at 07:48

## 2025-06-19 RX ADMIN — PROPOFOL 20 MG: 10 INJECTION, EMULSION INTRAVENOUS at 07:54

## 2025-06-19 RX ADMIN — PROPOFOL 20 MG: 10 INJECTION, EMULSION INTRAVENOUS at 07:57

## 2025-06-19 RX ADMIN — PROPOFOL 20 MG: 10 INJECTION, EMULSION INTRAVENOUS at 08:00

## 2025-06-19 RX ADMIN — PROPOFOL 110 MG: 10 INJECTION, EMULSION INTRAVENOUS at 07:45

## 2025-06-19 RX ADMIN — PROPOFOL 20 MG: 10 INJECTION, EMULSION INTRAVENOUS at 07:59

## 2025-06-19 RX ADMIN — PROPOFOL 20 MG: 10 INJECTION, EMULSION INTRAVENOUS at 07:50

## 2025-06-19 RX ADMIN — PROPOFOL 20 MG: 10 INJECTION, EMULSION INTRAVENOUS at 07:47

## 2025-06-19 SDOH — HEALTH STABILITY: MENTAL HEALTH: CURRENT SMOKER: 1

## 2025-06-19 ASSESSMENT — COLUMBIA-SUICIDE SEVERITY RATING SCALE - C-SSRS
2. HAVE YOU ACTUALLY HAD ANY THOUGHTS OF KILLING YOURSELF?: NO
1. IN THE PAST MONTH, HAVE YOU WISHED YOU WERE DEAD OR WISHED YOU COULD GO TO SLEEP AND NOT WAKE UP?: NO
6. HAVE YOU EVER DONE ANYTHING, STARTED TO DO ANYTHING, OR PREPARED TO DO ANYTHING TO END YOUR LIFE?: NO

## 2025-06-19 ASSESSMENT — PAIN - FUNCTIONAL ASSESSMENT
PAIN_FUNCTIONAL_ASSESSMENT: 0-10

## 2025-06-19 ASSESSMENT — PAIN SCALES - GENERAL
PAINLEVEL_OUTOF10: 0 - NO PAIN
PAIN_LEVEL: 0

## 2025-06-19 NOTE — LETTER
"June 30, 2025     Marina Collazo  [address] [id number]      Dear Ms. Collazo:    Below are the results from your recent visit:    Resulted Orders   Surgical Pathology Exam   Result Value Ref Range    Case Report       Surgical Pathology                                Case: [id number]                                  Authorizing Provider:  Jermaine Oakley MD         Collected:           06/19/2025 0750              Ordering Location:     [facility]   Received:            06/19/2025 0831              Pathologist:           Rachid Wu MD                                                           Specimens:   A) - COLON - SIGMOID POLYP, sigmoid polyp cold snare                                                B) - COLON - DESCENDING POLYP, cold snare                                                  FINAL DIAGNOSIS       A. COLON, SIGMOID, POLYP:   -- Serrated polyp with focal features suggestive of sessile serrated adenoma.     B. COLON, DESCENDING, POLYP:   -- Fragments of serrated polyp with focal features suggestive of sessile serrated adenoma.                 By the signature on this report, the individual or group listed as making the Final Interpretation/Diagnosis certifies that they have reviewed this case.       Clinical History       Z12.11 - Screening colonoscopy      Gross Description       A: Received in formalin, labeled with the patient's name and hospital number and \"sigmoid polyp, cold snare\", is a fragment of tan, soft tissue measuring 0.8 x 0.5 x 0.5 cm. The specimen is submitted in toto in one cassette.  SSD  B: Received in formalin, labeled with the patient's name and hospital number and \"descending polyp, cold snare\", are multiple fragments of tan, soft tissue aggregating to 2.4 x 1.0 x 0.6 cm. The specimen is submitted in toto in one cassette.  SSD     Let her know the polyps were removed.  These are abnormal but have no cancer.  She will need a " repeat colonoscopy in 6 months.  You are already scheduled for this. Please no smoking four weeks before next colonosocpy                  If you have any questions or concerns, please don't hesitate to call.         Sincerely,        Jermaine Oakley MD

## 2025-06-19 NOTE — DISCHARGE INSTRUCTIONS
Patient Instructions after a Colonoscopy      The anesthetics, sedatives or narcotics which were given to you today will be acting in your body for the next 24 hours, so you might feel a little sleepy or groggy.  This feeling should slowly wear off. Carefully read and follow the instructions.     You received sedation today:  - Do not drive or operate any machinery or power tools of any kind.   - No alcoholic beverages today, not even beer or wine.  - Do not make any important decisions or sign any legal documents.  - No over the counter medications that contain alcohol or that may cause drowsiness.  - Do not make any important decisions or sign any legal documents.  - Make sure you have someone with you for first 24 hours.    While it is common to experience mild to moderate abdominal distention, gas, or belching after your procedure, if any of these symptoms occur following discharge from the GI Lab or within one week of having your procedure, call the [facility] to be advised whether a visit to your nearest Urgent Care or Emergency Department is indicated.  Take this paper with you if you go.     - If you develop an allergic reaction to the medications that were given during your procedure such as difficulty breathing, rash, hives, severe nausea, vomiting or lightheadedness.  - If you experience chest pain, shortness of breath, severe abdominal pain, fevers and chills.  -If you develop signs and symptoms of bleeding such as blood in your spit, if your stools turn black, tarry, or bloody  - If you have not urinated within 8 hours following your procedure.  - If your IV site becomes painful, red, inflamed, or looks infected.    If you received a biopsy/polypectomy/sphincterotomy the following instructions apply below:    __ Do not use Aspirin containing products, non-steroidal medications or anti-coagulants for one week following your procedure. (Examples of these types of medications are: Advil,  Arthrotec, Aleve, Coumadin, Ecotrin, Heparin, Ibuprofen, Indocin, Motrin, Naprosyn, Nuprin, Plavix, Vioxx, and Voltarin, or their generic forms.  This list is not all-inclusive.  Check with your physician or pharmacist before resuming medications.)   __ Eat a soft diet today.  Avoid foods that are poorly digested for the next 24 hours.  These foods would include: nuts, beans, lettuce, red meats, and fried foods. Start with liquids and advance your diet as tolerated, gradually work up to eating solids.   __ Do not have a Barium Study or Enema for one week.    Your physician recommends the additional following instructions:    -You have a contact number available for emergencies. The signs and symptoms of potential delayed complications were discussed with you. You may return to normal activities tomorrow.  -Resume your previous diet.  -Continue your present medications.   -We are waiting for your pathology results.  -Your physician has recommended a repeat colonoscopy (date to be determined after pending pathology results are reviewed) for surveillance based on pathology results.  -The findings and recommendations have been discussed with you.  -The findings and recommendations were discussed with your family.  - Please see Medication Reconciliation Form for new medication/medications prescribed.       If you experience any problems or have any questions following discharge from the GI Lab, please call:        Nurse Signature                                                                        Date___________________                                                                            Patient/Responsible Party Signature                                        Date___________________

## 2025-06-19 NOTE — ANESTHESIA POSTPROCEDURE EVALUATION
Patient: Marina Collazo    Procedure Summary       Date: 06/19/25 Room / Location: [facility]    Anesthesia Start: 0741 Anesthesia Stop: 0821    Procedure: COLONOSCOPY Diagnosis: Special screening for malignant neoplasms, colon    Scheduled Providers: Jermaine Oakley MD Responsible Provider: DEANN Rivas    Anesthesia Type: MAC ASA Status: 3            Anesthesia Type: MAC    Vitals Value Taken Time   /85 06/19/25 08:17   Temp 36.6 °C (97.9 °F) 06/19/25 08:17   Pulse 73 06/19/25 08:17   Resp 16 06/19/25 08:17   SpO2 98 % 06/19/25 08:17       Anesthesia Post Evaluation    Patient location during evaluation: PACU  Patient participation: complete - patient participated  Level of consciousness: awake  Pain score: 0  Pain management: adequate  Airway patency: patent  Cardiovascular status: acceptable  Respiratory status: acceptable  Hydration status: acceptable  Postoperative Nausea and Vomiting: none        There were no known notable events for this encounter.

## 2025-06-19 NOTE — H&P
"History Of Present Illness  Marina Collazo is a 65 y.o. female presenting for a colonoscopy      Past Medical History  Medical History[1]    Surgical History  Surgical History[2]     Social History  She reports that she has been smoking cigarettes. She started smoking about 2 years ago. She has a 0.5 pack-year smoking history. She has never used smokeless tobacco. She reports current alcohol use. She reports that she does not use drugs.    Family History  Family History[3]     Allergies  Chlorpheniram-dm-acetaminophen, Codeine, Doxylamine-dm-acetaminophen, Lorazepam, Penicillins, Propoxyphene, and Metformin    Review of Systems   All other systems reviewed and are negative.       Physical Exam  Constitutional:       Appearance: Normal appearance.   Cardiovascular:      Heart sounds: Normal heart sounds.   Pulmonary:      Breath sounds: Normal breath sounds and air entry.   Abdominal:      General: Abdomen is flat.      Palpations: Abdomen is soft.      Tenderness: There is no abdominal tenderness.   Neurological:      Mental Status: She is alert.          Last Recorded Vitals  Blood pressure 135/82, pulse 87, temperature 36.1 °C (97 °F), temperature source Temporal, resp. rate 17, height 1.702 m (5' 7\"), weight 78 kg (172 lb), SpO2 95%.       Assessment & Plan  Special screening for malignant neoplasms, colon      COLONOSCOPY/BIOPSIES.  Risks include, but not limited to pain, infection, bleeding, perforation, missed lesions, aspiration, risk of cardiac, pulmonary, neurologic, locomotor, anesthetic events, incomplete colonoscopy, and other unforeseen complications including death      Jermaine Oakley MD         [1]   Past Medical History:  Diagnosis Date    COPD (chronic obstructive pulmonary disease) (Multi)     Disease of thyroid gland     Hyperlipidemia     Hypertension     Hypothyroidism     Myocardial infarction (Multi)     Personal history of other diseases of the respiratory system 05/15/2014    Personal " history of asthma    Personal history of other endocrine, nutritional and metabolic disease 05/15/2014    History of Graves' disease    Pneumonia     Posttraumatic deformity of nasal septum     Sleep apnea     No CPAP    Type 2 diabetes mellitus without complications 07/01/2015    Diabetes mellitus   [2]   Past Surgical History:  Procedure Laterality Date    APPENDECTOMY  05/15/2014    Appendectomy    CARDIAC CATHETERIZATION      CAROTID STENT      2- Oct 2023-x 2    MULTIPLE TOOTH EXTRACTIONS Bilateral     OTHER SURGICAL HISTORY Left 05/15/2020    Foot fracture repair-ORIF   [3]   Family History  Problem Relation Name Age of Onset    Stroke Mother      Sleep apnea Daughter

## 2025-06-25 ENCOUNTER — PHARMACY VISIT (OUTPATIENT)
Dept: PHARMACY | Facility: CLINIC | Age: 65
End: 2025-06-25
Payer: MEDICAID

## 2025-06-25 ENCOUNTER — APPOINTMENT (OUTPATIENT)
Dept: ENDOCRINOLOGY | Facility: CLINIC | Age: 65
End: 2025-06-25
Payer: MEDICAID

## 2025-06-25 VITALS
SYSTOLIC BLOOD PRESSURE: 155 MMHG | DIASTOLIC BLOOD PRESSURE: 86 MMHG | WEIGHT: 169.8 LBS | HEART RATE: 78 BPM | BODY MASS INDEX: 26.59 KG/M2

## 2025-06-25 DIAGNOSIS — E11.9 TYPE 2 DIABETES MELLITUS WITHOUT COMPLICATION, WITHOUT LONG-TERM CURRENT USE OF INSULIN: ICD-10-CM

## 2025-06-25 LAB — POC HEMOGLOBIN A1C: 6.9 % (ref 4.2–6.5)

## 2025-06-25 PROCEDURE — RXMED WILLOW AMBULATORY MEDICATION CHARGE

## 2025-06-25 PROCEDURE — 3077F SYST BP >= 140 MM HG: CPT | Performed by: INTERNAL MEDICINE

## 2025-06-25 PROCEDURE — 4010F ACE/ARB THERAPY RXD/TAKEN: CPT | Performed by: INTERNAL MEDICINE

## 2025-06-25 PROCEDURE — 83036 HEMOGLOBIN GLYCOSYLATED A1C: CPT | Performed by: INTERNAL MEDICINE

## 2025-06-25 PROCEDURE — 3044F HG A1C LEVEL LT 7.0%: CPT | Performed by: INTERNAL MEDICINE

## 2025-06-25 PROCEDURE — 3079F DIAST BP 80-89 MM HG: CPT | Performed by: INTERNAL MEDICINE

## 2025-06-25 PROCEDURE — 99214 OFFICE O/P EST MOD 30 MIN: CPT | Performed by: INTERNAL MEDICINE

## 2025-06-25 RX ORDER — BLOOD-GLUCOSE SENSOR
EACH MISCELLANEOUS
Qty: 9 EACH | Refills: 3 | Status: SHIPPED | OUTPATIENT
Start: 2025-06-25

## 2025-06-25 ASSESSMENT — ENCOUNTER SYMPTOMS
BACK PAIN: 1
POLYDIPSIA: 0
SHORTNESS OF BREATH: 0
DIARRHEA: 0
ABDOMINAL PAIN: 0
NAUSEA: 0
SORE THROAT: 0
FEVER: 0
ARTHRALGIAS: 0
COUGH: 0
VOMITING: 0
FREQUENCY: 0
APNEA: 1
APPETITE CHANGE: 0
ROS SKIN COMMENTS: DRY
UNEXPECTED WEIGHT CHANGE: 1
CONSTIPATION: 0
NERVOUS/ANXIOUS: 0
HEADACHES: 0

## 2025-06-25 NOTE — PATIENT INSTRUCTIONS
A1c 6.9%    RECOMMENDATIONS  Hold Jardiance for 2 weeks to see if genital symptoms resolve  May substitute with Farxiga 5 mg/day to see if reaction is the same.   Ozempic also has cardiac benefits.      Follow up 3 months

## 2025-06-25 NOTE — LETTER
July 6, 2025     Vianey Summers PA-C  [address] [facility] [place] [address]    Patient: Marina Collazo   YOB: 1960   Date of Visit: 6/25/2025       Dear Dr. Vianey Summers PA-C:    Thank you for referring Marina Collazo to me for evaluation. Below are my notes for this consultation.  If you have questions, please do not hesitate to call me. I look forward to following your patient along with you.       Sincerely,     Pollo Graves MD      CC: No Recipients  ______________________________________________________________________________________    History Of Present Illness  Marina Collazo is a 65 y.o. female     Duration of type 2 diabetes mellitus:  5 years  Complications:  Coronary artery disease      Jardiance 10 mg/day  Genital and perianal itching attributed to Jardiance.   One UTI  Cleansing pads, cranberry juice recommended by FABRICIO Summers.       DexCom G7:   out of sensors     Graves' orbitopathy  Last eye exam:  Dr Campbell, July 2023  CT orbits done    Past Medical History  She has a past medical history of COPD (chronic obstructive pulmonary disease) (Multi), Disease of thyroid gland, Hyperlipidemia, Hypertension, Hypothyroidism, Myocardial infarction (Multi), Personal history of other diseases of the respiratory system (05/15/2014), Personal history of other endocrine, nutritional and metabolic disease (05/15/2014), Pneumonia, Posttraumatic deformity of nasal septum, Sleep apnea, and Type 2 diabetes mellitus without complications (07/01/2015).    She has no past medical history of Personal history of irradiation.    Surgical History  She has a past surgical history that includes Appendectomy (05/15/2014); Other surgical history (Left, 05/15/2020); Cardiac catheterization; Carotid stent; and Multiple tooth extractions (Bilateral).     Social History  She reports that she has been smoking cigarettes. She started smoking about  2 years ago. She has a 0.5 pack-year smoking history. She has never used smokeless tobacco. She reports current alcohol use. She reports that she does not use drugs.    Family History  Family History[1]    Medications  Current Outpatient Medications   Medication Instructions   • albuterol (Ventolin HFA) 90 mcg/actuation inhaler Inhale.   • albuterol 2.5 mg /3 mL (0.083 %) nebulizer solution Use 1 vial every eight hours for breathing treatments as needed as directed   • albuterol 90 mcg/actuation inhaler Inhale 1-2 puff four times a day as needed as directed   • albuterol 2.5 mg, Every 6 hours PRN   • amLODIPine (Norvasc) 10 mg tablet 1 tablet, Daily   • amLODIPine (Norvasc) 10 mg tablet Take 1 tablet by mouth once a day   • aspirin 81 mg EC tablet 1 tablet, Daily   • atorvastatin (Lipitor) 40 mg tablet take 1 tablet by mouth every evening   • blood-glucose sensor (Dexcom G7 Sensor) device for continuous glucose monitoring, change every 10 days   • cetirizine (ZyrTEC) 10 mg capsule Take by mouth.   • Clenpiq 10 mg-3.5 gram- 12 gram/175 mL solution 2 bottles, oral, As needed   • Clenpiq 10 mg-3.5 gram- 12 gram/175 mL solution 2 bottles, oral, As needed   • empagliflozin (Jardiance) 10 mg tablet Take 1 tablet by mouth once a day   • hydrOXYzine HCL (Atarax) 25 mg tablet Take 1 tablet by mouth at bedtime as needed for sleep   • Jardiance 10 mg, oral, Daily   • levothyroxine (Synthroid, Levoxyl) 25 mcg tablet TAKE 1 TABLET BY MOUTH ON TUESDAY , THURSDAY AND SATURDAY   • levothyroxine (Synthroid, Levoxyl) 50 mcg tablet TAKE 1 TABLET BY MOUTH ON MONDAY, WEDNESDAY, FRIDAY AND SUNDAY   • lisinopril 10 mg tablet 1 tablet, Daily   • lisinopril 10 mg tablet Take 1 tablet by mouth once a day   • loratadine (Claritin) 10 mg tablet take 1 tablet by mouth once daily as needed allergies   • metoprolol succinate XL (Toprol-XL) 25 mg 24 hr tablet 1 tablet, Daily   • nicotine (Nicoderm CQ) 14 mg/24 hr patch transdermal, 1 patch(14mg)  once daily for 7 day **use after 21mg patches are done   • nicotine (Nicoderm CQ) 21 mg/24 hr patch Place 1 patch(21mg) on the skin once daily for 42 days, THEN 1 patch(14mg) once daily for 7 days, THEN 1 patch(7mg) once daily for 7 days.   • nicotine (Nicoderm CQ) 7 mg/24 hr patch Place on the skin.Place 1 patch(7mg) once daily for 7 days.  **use after 21mg and 14 mg patches are done   • rOPINIRole (Requip) 0.5 mg tablet 1 tablet, Daily   • rOPINIRole (Requip) 0.5 mg tablet Take 1 tablet by mouth once daily every night       Allergies  Chlorpheniram-dm-acetaminophen, Codeine, Doxylamine-dm-acetaminophen, Lorazepam, Penicillins, Propoxyphene, and Metformin    Review of Systems   Constitutional:  Positive for unexpected weight change. Negative for appetite change and fever.   HENT:  Negative for sore throat.         Denies dry mouth   Eyes:  Negative for visual disturbance.        Dry   Respiratory:  Positive for apnea (sleep). Negative for cough and shortness of breath.    Cardiovascular:  Negative for chest pain.   Gastrointestinal:  Negative for abdominal pain, constipation, diarrhea, nausea and vomiting.   Endocrine: Negative for polydipsia and polyuria.   Genitourinary:  Negative for frequency.        As above   Musculoskeletal:  Positive for back pain. Negative for arthralgias.   Skin:  Negative for rash.        dry   Neurological:  Negative for headaches.   Psychiatric/Behavioral:  The patient is not nervous/anxious.          Last Recorded Vitals  Blood pressure 155/86, pulse 78, weight 77 kg (169 lb 12.8 oz).    Physical Exam  Constitutional:       General: She is not in acute distress.  HENT:      Head: Normocephalic.      Mouth/Throat:      Mouth: Mucous membranes are moist.   Eyes:      Extraocular Movements: Extraocular movements intact.      Comments: Bilateral proptosis and periorbital edema   Neck:      Thyroid: No thyroid mass or thyromegaly.   Cardiovascular:      Pulses:           Radial pulses are  2+ on the right side and 2+ on the left side.        Dorsalis pedis pulses are 1+ on the right side and 2+ on the left side.   Musculoskeletal:      Right lower leg: No edema.      Left lower leg: No edema.   Feet:      Comments: Bilateral toe deformities  Dusky toes  Callus formation right 1st MTP  Lymphadenopathy:      Cervical: No cervical adenopathy.   Skin:     Comments: No foot sores   Neurological:      Mental Status: She is alert.      Motor: No tremor.   Psychiatric:         Mood and Affect: Affect normal.          Relevant Results  Glucose (mg/dL)   Date Value   12/12/2022 99   01/18/2021 151 (H)   01/17/2021 257 (H)     POC HEMOGLOBIN A1c (%)   Date Value   06/25/2025 6.9 (A)   08/13/2024 6.7 (A)   02/13/2024 7.4 (A)     Bicarbonate (mmol/L)   Date Value   12/12/2022 25   01/18/2021 26   01/17/2021 23     Urea Nitrogen (mg/dL)   Date Value   12/12/2022 14   01/18/2021 10   01/17/2021 6     Creatinine (mg/dL)   Date Value   12/12/2022 0.80   01/18/2021 0.64   01/17/2021 0.62     Lab Results   Component Value Date    CHOL 203 (H) 01/18/2021     Lab Results   Component Value Date    HDL 41.0 01/18/2021     Lab Results   Component Value Date    TRIG 293 (H) 01/18/2021     Lab Results   Component Value Date    TSH 1.06 05/15/2020       IMPRESSION  TYPE 2 DIABETES MELLITUS  Complicated by CAD  Rapid A1c 6.9%  Glucose well controlled  Genital symptoms which may be attributable to Jardiance      RECOMMENDATIONS  Hold Jardiance for 2 weeks to see if genital symptoms resolve  May substitute with Farxiga 5 mg/day to see if reaction is the same.   Ozempic also has cardiac benefits.      Follow up 3 months              [1]  Family History  Problem Relation Name Age of Onset   • Stroke Mother     • Sleep apnea Daughter          [1]  Family History  Problem Relation Name Age of Onset   • Stroke Mother     • Sleep apnea Daughter

## 2025-06-25 NOTE — PROGRESS NOTES
History Of Present Illness  Marina Collazo is a 65 y.o. female     Duration of type 2 diabetes mellitus:  5 years  Complications:  Coronary artery disease      Jardiance 10 mg/day  Genital and perianal itching attributed to Jardiance.   One UTI  Cleansing pads, cranberry juice recommended by FABRICIO Summers.       DexCom G7:   out of sensors     Graves' orbitopathy  Last eye exam:  Dr Campbell, July 2023  CT orbits done    Past Medical History  She has a past medical history of COPD (chronic obstructive pulmonary disease) (Multi), Disease of thyroid gland, Hyperlipidemia, Hypertension, Hypothyroidism, Myocardial infarction (Multi), Personal history of other diseases of the respiratory system (05/15/2014), Personal history of other endocrine, nutritional and metabolic disease (05/15/2014), Pneumonia, Posttraumatic deformity of nasal septum, Sleep apnea, and Type 2 diabetes mellitus without complications (07/01/2015).    She has no past medical history of Personal history of irradiation.    Surgical History  She has a past surgical history that includes Appendectomy (05/15/2014); Other surgical history (Left, 05/15/2020); Cardiac catheterization; Carotid stent; and Multiple tooth extractions (Bilateral).     Social History  She reports that she has been smoking cigarettes. She started smoking about 2 years ago. She has a 0.5 pack-year smoking history. She has never used smokeless tobacco. She reports current alcohol use. She reports that she does not use drugs.    Family History  Family History[1]    Medications  Current Outpatient Medications   Medication Instructions    albuterol (Ventolin HFA) 90 mcg/actuation inhaler Inhale.    albuterol 2.5 mg /3 mL (0.083 %) nebulizer solution Use 1 vial every eight hours for breathing treatments as needed as directed    albuterol 90 mcg/actuation inhaler Inhale 1-2 puff four times a day as needed as directed    albuterol 2.5 mg, Every 6 hours PRN    amLODIPine (Norvasc) 10 mg tablet  1 tablet, Daily    amLODIPine (Norvasc) 10 mg tablet Take 1 tablet by mouth once a day    aspirin 81 mg EC tablet 1 tablet, Daily    atorvastatin (Lipitor) 40 mg tablet take 1 tablet by mouth every evening    blood-glucose sensor (Dexcom G7 Sensor) device for continuous glucose monitoring, change every 10 days    cetirizine (ZyrTEC) 10 mg capsule Take by mouth.    Clenpiq 10 mg-3.5 gram- 12 gram/175 mL solution 2 bottles, oral, As needed    Clenpiq 10 mg-3.5 gram- 12 gram/175 mL solution 2 bottles, oral, As needed    empagliflozin (Jardiance) 10 mg tablet Take 1 tablet by mouth once a day    hydrOXYzine HCL (Atarax) 25 mg tablet Take 1 tablet by mouth at bedtime as needed for sleep    Jardiance 10 mg, oral, Daily    levothyroxine (Synthroid, Levoxyl) 25 mcg tablet TAKE 1 TABLET BY MOUTH ON TUESDAY , THURSDAY AND SATURDAY    levothyroxine (Synthroid, Levoxyl) 50 mcg tablet TAKE 1 TABLET BY MOUTH ON MONDAY, WEDNESDAY, FRIDAY AND SUNDAY    lisinopril 10 mg tablet 1 tablet, Daily    lisinopril 10 mg tablet Take 1 tablet by mouth once a day    loratadine (Claritin) 10 mg tablet take 1 tablet by mouth once daily as needed allergies    metoprolol succinate XL (Toprol-XL) 25 mg 24 hr tablet 1 tablet, Daily    nicotine (Nicoderm CQ) 14 mg/24 hr patch transdermal, 1 patch(14mg) once daily for 7 day **use after 21mg patches are done    nicotine (Nicoderm CQ) 21 mg/24 hr patch Place 1 patch(21mg) on the skin once daily for 42 days, THEN 1 patch(14mg) once daily for 7 days, THEN 1 patch(7mg) once daily for 7 days.    nicotine (Nicoderm CQ) 7 mg/24 hr patch Place on the skin.Place 1 patch(7mg) once daily for 7 days.  **use after 21mg and 14 mg patches are done    rOPINIRole (Requip) 0.5 mg tablet 1 tablet, Daily    rOPINIRole (Requip) 0.5 mg tablet Take 1 tablet by mouth once daily every night       Allergies  Chlorpheniram-dm-acetaminophen, Codeine, Doxylamine-dm-acetaminophen, Lorazepam, Penicillins, Propoxyphene, and  Metformin    Review of Systems   Constitutional:  Positive for unexpected weight change. Negative for appetite change and fever.   HENT:  Negative for sore throat.         Denies dry mouth   Eyes:  Negative for visual disturbance.        Dry   Respiratory:  Positive for apnea (sleep). Negative for cough and shortness of breath.    Cardiovascular:  Negative for chest pain.   Gastrointestinal:  Negative for abdominal pain, constipation, diarrhea, nausea and vomiting.   Endocrine: Negative for polydipsia and polyuria.   Genitourinary:  Negative for frequency.        As above   Musculoskeletal:  Positive for back pain. Negative for arthralgias.   Skin:  Negative for rash.        dry   Neurological:  Negative for headaches.   Psychiatric/Behavioral:  The patient is not nervous/anxious.          Last Recorded Vitals  Blood pressure 155/86, pulse 78, weight 77 kg (169 lb 12.8 oz).    Physical Exam  Constitutional:       General: She is not in acute distress.  HENT:      Head: Normocephalic.      Mouth/Throat:      Mouth: Mucous membranes are moist.   Eyes:      Extraocular Movements: Extraocular movements intact.      Comments: Bilateral proptosis and periorbital edema   Neck:      Thyroid: No thyroid mass or thyromegaly.   Cardiovascular:      Pulses:           Radial pulses are 2+ on the right side and 2+ on the left side.        Dorsalis pedis pulses are 1+ on the right side and 2+ on the left side.   Musculoskeletal:      Right lower leg: No edema.      Left lower leg: No edema.   Feet:      Comments: Bilateral toe deformities  Dusky toes  Callus formation right 1st MTP  Lymphadenopathy:      Cervical: No cervical adenopathy.   Skin:     Comments: No foot sores   Neurological:      Mental Status: She is alert.      Motor: No tremor.   Psychiatric:         Mood and Affect: Affect normal.          Relevant Results  Glucose (mg/dL)   Date Value   12/12/2022 99   01/18/2021 151 (H)   01/17/2021 257 (H)     POC HEMOGLOBIN  A1c (%)   Date Value   06/25/2025 6.9 (A)   08/13/2024 6.7 (A)   02/13/2024 7.4 (A)     Bicarbonate (mmol/L)   Date Value   12/12/2022 25   01/18/2021 26   01/17/2021 23     Urea Nitrogen (mg/dL)   Date Value   12/12/2022 14   01/18/2021 10   01/17/2021 6     Creatinine (mg/dL)   Date Value   12/12/2022 0.80   01/18/2021 0.64   01/17/2021 0.62     Lab Results   Component Value Date    CHOL 203 (H) 01/18/2021     Lab Results   Component Value Date    HDL 41.0 01/18/2021     Lab Results   Component Value Date    TRIG 293 (H) 01/18/2021     Lab Results   Component Value Date    TSH 1.06 05/15/2020       IMPRESSION  TYPE 2 DIABETES MELLITUS  Complicated by CAD  Rapid A1c 6.9%  Glucose well controlled  Genital symptoms which may be attributable to Jardiance      RECOMMENDATIONS  Hold Jardiance for 2 weeks to see if genital symptoms resolve  May substitute with Farxiga 5 mg/day to see if reaction is the same.   Ozempic also has cardiac benefits.      Follow up 3 months           [1]   Family History  Problem Relation Name Age of Onset    Stroke Mother      Sleep apnea Daughter

## 2025-06-26 ENCOUNTER — TELEPHONE (OUTPATIENT)
Dept: SURGERY | Facility: CLINIC | Age: 65
End: 2025-06-26
Payer: MEDICAID

## 2025-06-26 LAB
LABORATORY COMMENT REPORT: NORMAL
PATH REPORT.FINAL DX SPEC: NORMAL
PATH REPORT.GROSS SPEC: NORMAL
PATH REPORT.RELEVANT HX SPEC: NORMAL
PATH REPORT.TOTAL CANCER: NORMAL

## 2025-06-26 NOTE — TELEPHONE ENCOUNTER
----- Message from Jermaine Oakley sent at 6/26/2025 12:45 PM EDT -----  Let her know the polyps were removed.  These are abnormal but have no cancer.  She will need a repeat colonoscopy in 6 months.  And should be scheduled for this.  ----- Message -----  From: Lab, Background User  Sent: 6/26/2025  11:50 AM EDT  To: Jermaine Oakley MD

## 2025-06-27 ENCOUNTER — TELEPHONE (OUTPATIENT)
Dept: SURGERY | Facility: CLINIC | Age: 65
End: 2025-06-27
Payer: MEDICAID

## 2025-06-30 ENCOUNTER — TELEPHONE (OUTPATIENT)
Dept: SURGERY | Facility: CLINIC | Age: 65
End: 2025-06-30
Payer: MEDICAID

## 2025-08-11 ENCOUNTER — PHARMACY VISIT (OUTPATIENT)
Dept: PHARMACY | Facility: CLINIC | Age: 65
End: 2025-08-11
Payer: MEDICAID

## 2025-08-11 PROCEDURE — RXMED WILLOW AMBULATORY MEDICATION CHARGE

## 2025-09-02 PROCEDURE — RXMED WILLOW AMBULATORY MEDICATION CHARGE

## 2025-09-03 ENCOUNTER — PHARMACY VISIT (OUTPATIENT)
Dept: PHARMACY | Facility: CLINIC | Age: 65
End: 2025-09-03
Payer: MEDICAID

## 2025-09-03 PROCEDURE — RXMED WILLOW AMBULATORY MEDICATION CHARGE

## 2025-09-30 ENCOUNTER — APPOINTMENT (OUTPATIENT)
Dept: ENDOCRINOLOGY | Facility: CLINIC | Age: 65
End: 2025-09-30
Payer: MEDICAID